# Patient Record
Sex: FEMALE | Race: WHITE | NOT HISPANIC OR LATINO | Employment: OTHER | URBAN - METROPOLITAN AREA
[De-identification: names, ages, dates, MRNs, and addresses within clinical notes are randomized per-mention and may not be internally consistent; named-entity substitution may affect disease eponyms.]

---

## 2018-01-09 NOTE — PROGRESS NOTES
Assessment    1  Ganglion cyst (725 43) (M67 40)    Plan  Ganglion cyst    · You may continue or resume your normal level of activity ; Status:Complete;   Done:  [id number]   · Follow-up PRN Evaluation and Treatment  Follow-up  Status: Complete  Done:  50PGN5793   · Aspiration and or Injection of ganglion cyst, any location - POC; Status:Complete;   Done:  74VYA7424    Discussion/Summary    Patient was seen and examined by Dr Grant Tapia and myself in the office today  Together we formulated a diagnosis and treatment plan which is as follows: The anatomy and physiology of the ganglion was discussed with the patient today in the office  Fluid leaking out of the joint surface typically creates a small sac, which can enlarge and cause pain or limitation of motion  Treatment options include observation, aspiration, or surgical incision were discussed with the patient today  Observation typically lead to resolution and approximately 10% of patients, aspiration least resolution approximately 50% of patients, and surgical excision lead to resolution in approximately 97% of patients  After discussion with the patient today, the patient voiced understanding of all treatment options       At this time patient wishes to proceed with aspiration  She will follow up on as-needed basis  If aspiration is unsuccessful she will proceed with surgical excision  Call for any questions or concerns should arise  Chief Complaint    1  Hand Problem    History of Present Illness  HPI: Patient is a very pleasant 43-year-old female who presents today for evaluation of right index finger DIP ganglion cyst  This is a recurrent cyst the patient had removed a few years ago  While the surgery worked very well at first, patient states this started to reappear approximately one year ago and has become more and more tender since   States she will place a Band-Aid on it at work which compresses it and makes it slightly better, however, area is tender enough patient would like to address this  The past medical history, surgical history, family history, social history, medications, allergies, and review of systems have been read and reviewed on the chart and have been updated  Review of Systems was recorded in the office today on a separate evaluation sheet and is listed below  Review of Systems    Constitutional: No fever, no chills, feels well, no tiredness, no recent weight gain or loss  Eyes: No complaints of eyesight problems, no red eyes  ENT: no loss of hearing, no nosebleeds, no sore throat  Cardiovascular: No complaints of chest pain, no palpitations, no leg claudication or lower extremity edema  Respiratory: no compliants of shortness of breath, no wheezing, no cough  Gastrointestinal: no complaints of abdominal pain, no constipation, no nausea or diarrhea, no vomiting, no bloody stools  Genitourinary: no complaints of dysuria, no incontinence  Musculoskeletal: as noted in HPI  Integumentary: no complaints of skin rash or lesion, no itching or dry skin, no skin wounds  Neurological: no complaints of headache, no confusion, no numbness or tingling, no dizziness  Endocrine: No complaints of muscle weakness, no feelings of weakness, no frequent urination, no excessive thirst    Psychiatric: No suicidal thoughts, no anxiety, no feelings of depression  Active Problems    1  Hyperlipidemia (272 4) (E78 5)   2  Need for pneumococcal vaccination (V03 82) (Z23)   3  Positive hepatitis C antibody test (795 79) (R89 4)    Current Meds   1  No Reported Medications Recorded    Allergies    1  Codeine Derivatives   2  Sudafed TABS    Vitals  Signs [Data Includes: Current Encounter]    Heart Rate: 82  Systolic: 554  Diastolic: 77  Height: 5 ft 4 in  Weight: 187 lb   BMI Calculated: 32 1  BSA Calculated: 1 91    Physical Exam      General: No acute distress, age-appropriate  Neck: Supple, trachea midline     HEENT: Normocephalic atraumatic, mucous membranes are moist, sclera are nonicteric  Cardiovascular: No discernable arrhythmia  Respiratory: Breathing is even and unlabored, no stridor or audible wheezing  Psychiatric: Awake alert and oriented x3, normal mood and affect  Abdomen: Without rebound or guarding  Right Fingers: (A shoe with a ganglion cyst along the posterior DIP the right index finger, just under 1 cm in diameter  Patient describes tenderness to palpation of this area  Patient denies tenderness to palpation of the DIP joint itself  Denies discomfort with motion of the PIP  Sensation is intact  Motor intact  Capillary refill less than 2 seconds  Radial pulse 2+)       Procedure                After confirmation of analgesia and repreparation, the Right index finger ganglion cyst was aspirated with an 18 guague needle  A sterile dressing was then appplied  The patient tolerated the procedure well  Attending Note  Collaborating Physician Note: Collaborating Physician: I interviewed and examined the patient, I supervised the Advanced Practitioner and I agree with the Advanced Practitioner note        Signatures   Electronically signed by : Quinn Dixon [facility]; Jan 27 2016 10:45AM EST                       (Author)    Electronically signed by : POOJA Ochoa ; Jan 27 2016  1:11PM EST                       (Author)

## 2018-01-12 NOTE — MISCELLANEOUS
Message   Recorded as Task   Date: 06/22/2016 08:28 AM, Created By: Daniela Hawk   Task Name: Med Renewal Request   Assigned To: Lee Cassidy   Regarding Patient: Magdy Adhikari, Status: Active   Comment:    Katelin Love - 22 Jun 2016 8:28 AM     TASK CREATED  Caller: Self; Renew Medication; [phone] (Home); [phone] (Work)  Kraig Hays WAS GIVEN AN ANTIBIOTIC BY DR WILEY WHEN SHE GOT HOME FROM [place] WITH AN INFECTION WHICH SHE FINISHED ON THE 17TH AND SHE IS STARTING UP WITH IT AGAIN AND SHE IS WONDERING IF ANOTHER ROUND OF ANTIBIOTICS COULD BE CALLED INTO    [facility]    [phone] (C)   Georgie,Samm - 22 Jun 2016 8:30 AM     TASK EDITED  script sent in for ceftin  please notify   06/22/2016 Patient notified  trb      Active Problems    1  Acute non-recurrent pansinusitis (461 8) (J01 40)   2  Hyperlipidemia (272 4) (E78 5)   3  Positive hepatitis C antibody test (795 79) (R76 8)    Current Meds   1  Cefuroxime Axetil 500 MG Oral Tablet; Take 1 tablet twice daily; Therapy: 29ZJH3090 to (Last [facility] [place])  Requested for: 75RXX9002 Ordered    Allergies    1  Codeine Derivatives   2  Sudafed TABS    Signatures   Electronically signed by :  Philipp Bee, ; Jun 22 2016 10:41AM EST                       (Author)

## 2021-10-08 ENCOUNTER — APPOINTMENT (OUTPATIENT)
Dept: PREADMISSION TESTING | Facility: HOSPITAL | Age: 66
End: 2021-10-08
Payer: COMMERCIAL

## 2021-10-08 VITALS — HEIGHT: 64 IN | BODY MASS INDEX: 35.85 KG/M2 | WEIGHT: 210 LBS

## 2021-10-08 RX ORDER — ACETAMINOPHEN 500 MG
500 TABLET ORAL EVERY 6 HOURS PRN
COMMUNITY

## 2021-10-08 NOTE — PRE-PROCEDURE INSTRUCTIONS
1. We will call you between 3 pm and 7 pm on October 13, 2021 to inform you of arrival time for your procedure. If you do not hear by 6PM, please call [phone] for arrival time.     2. Please arrive through the Main Entrance of the [facility] (across from the parking garage) and report to the Surgery Registration Desk on the day of your procedure.    3. Please follow the following fasting guidelines:     No solid food EIGHT HOURS prior to surgery.  Unlimited clear liquids, meaning water or PLAIN black coffee WITHOUT any milk, cream, sugar, or sweetener are permitted up to TWO HOURS prior to arrival at the hospital.    4. Early on the morning of the procedure please take your usual dose of the listed medications with a sip of water:  No meds  Follow directions regarding holding NSAIDS , ASPIRIN and BLOOD THINNERS from your MD and or Cardiologist    5. Other Instructions: You may brush your teeth the morning of the procedure. Rinse and spit, do not swallow.  Bring a list of your medications with dosages.  Use surgical wash as directed.     6. If you develop a cold, cough, fever, rash, or other symptom prior to the day of the procedure, please report it to your physician immediately.    7. If you need to cancel the procedure for any reason, please contact your physician.    8. Make arrangements to have someone drive you home from the procedure. If you have not arranged for transportation home, your surgery may be cancelled.     9. You may not take public transportation unless accompanied by a responsible person.    10. You may not drive a car or operate complex or potentially dangerous machinery for 24 hours following anesthesia and/or sedation.    11.  If it is medically necessary for you to have a longer stay, you will be informed as soon as the decision is made.    12. Only bring essential items to the hospital.  Do not wear or bring anything of value to the hospital including jewelry of any kind, money, or wallet.  Do not wear make-up or contact lenses.  DO NOT BRING MEDICATIONS FROM HOME. DO bring your hearing aids, glasses, and a case    13. No lotion, creams, powders, or oils on skin the morning of procedure     14. Dress in comfortable clothes.    15.  If instructed, please bring a copy of your Advanced Directive (Living Will/Durable Power of ) on the day of your procedure.     16. Patients need to quarantine from the time of PAT COVID test to day of surgery, regardless of COVID vaccine status.      17. Ensuring your safety at all times is a very important part of our [facility] Culture of Safety. After having surgery and sedation, you are at risk for falling and balance issues. Although you may feel awake, the effects of the medication can last up to 24 hours after anesthesia. If you need to use the bathroom during your recovery period, nursing staff will escort you there and stay with you to ensure your safety.    Pre operative instructions given as per protocol.  Form explained by: Josseline Head RN    I have read and understand the above information. I have had sufficient opportunity to ask questions I might have and they have been answered to my satisfaction. I agree to comply with the Patient Responsibilities listed above and have received a copy of this form.

## 2021-10-11 ENCOUNTER — TRANSCRIBE ORDERS (OUTPATIENT)
Dept: REGISTRATION | Facility: HOSPITAL | Age: 66
End: 2021-10-11
Payer: COMMERCIAL

## 2021-10-11 ENCOUNTER — APPOINTMENT (OUTPATIENT)
Dept: LAB | Facility: HOSPITAL | Age: 66
DRG: 469 | End: 2021-10-11
Attending: ORTHOPAEDIC SURGERY
Payer: COMMERCIAL

## 2021-10-11 ENCOUNTER — HOSPITAL ENCOUNTER (OUTPATIENT)
Dept: CARDIOLOGY | Facility: HOSPITAL | Age: 66
Discharge: HOME | DRG: 469 | End: 2021-10-11
Attending: ORTHOPAEDIC SURGERY
Payer: COMMERCIAL

## 2021-10-11 ENCOUNTER — OFFICE VISIT (OUTPATIENT)
Dept: PREADMISSION TESTING | Facility: HOSPITAL | Age: 66
DRG: 469 | End: 2021-10-11
Attending: ORTHOPAEDIC SURGERY
Payer: COMMERCIAL

## 2021-10-11 VITALS
TEMPERATURE: 98.1 F | WEIGHT: 211 LBS | DIASTOLIC BLOOD PRESSURE: 66 MMHG | HEART RATE: 78 BPM | OXYGEN SATURATION: 98 % | RESPIRATION RATE: 16 BRPM | SYSTOLIC BLOOD PRESSURE: 138 MMHG | HEIGHT: 64 IN | BODY MASS INDEX: 36.02 KG/M2

## 2021-10-11 DIAGNOSIS — M12.571 TRAUMATIC ARTHROPATHY, RIGHT ANKLE AND FOOT: ICD-10-CM

## 2021-10-11 DIAGNOSIS — Z11.59 ENCOUNTER FOR SCREENING FOR OTHER VIRAL DISEASES: Primary | ICD-10-CM

## 2021-10-11 DIAGNOSIS — Z11.59 ENCOUNTER FOR SCREENING FOR OTHER VIRAL DISEASES: ICD-10-CM

## 2021-10-11 DIAGNOSIS — T84.84XA PAINFUL ORTHOPAEDIC HARDWARE (CMS/HCC): Primary | ICD-10-CM

## 2021-10-11 DIAGNOSIS — Z01.818 PRE-OP EVALUATION: ICD-10-CM

## 2021-10-11 LAB
ANION GAP SERPL CALC-SCNC: 9 MEQ/L (ref 3–15)
BUN SERPL-MCNC: 13 MG/DL (ref 8–20)
CALCIUM SERPL-MCNC: 9.2 MG/DL (ref 8.9–10.3)
CHLORIDE SERPL-SCNC: 103 MEQ/L (ref 98–109)
CO2 SERPL-SCNC: 25 MEQ/L (ref 22–32)
CREAT SERPL-MCNC: 0.7 MG/DL (ref 0.6–1.1)
ERYTHROCYTE [DISTWIDTH] IN BLOOD BY AUTOMATED COUNT: 12.3 % (ref 11.7–14.4)
GFR SERPL CREATININE-BSD FRML MDRD: >60 ML/MIN/1.73M*2
GLUCOSE SERPL-MCNC: 94 MG/DL (ref 70–99)
HCT VFR BLDCO AUTO: 43.3 % (ref 35–45)
HGB BLD-MCNC: 13.9 G/DL (ref 11.8–15.7)
MCH RBC QN AUTO: 31.7 PG (ref 28–33.2)
MCHC RBC AUTO-ENTMCNC: 32.1 G/DL (ref 32.2–35.5)
MCV RBC AUTO: 98.6 FL (ref 83–98)
PDW BLD AUTO: 10.4 FL (ref 9.4–12.3)
PLATELET # BLD AUTO: 229 K/UL (ref 150–369)
POTASSIUM SERPL-SCNC: 3.9 MEQ/L (ref 3.6–5.1)
RBC # BLD AUTO: 4.39 M/UL (ref 3.93–5.22)
SARS-COV-2 RNA RESP QL NAA+PROBE: NEGATIVE
SODIUM SERPL-SCNC: 137 MEQ/L (ref 136–144)
WBC # BLD AUTO: 6.91 K/UL (ref 3.8–10.5)

## 2021-10-11 PROCEDURE — 36415 COLL VENOUS BLD VENIPUNCTURE: CPT

## 2021-10-11 PROCEDURE — 80048 BASIC METABOLIC PNL TOTAL CA: CPT

## 2021-10-11 PROCEDURE — U0003 INFECTIOUS AGENT DETECTION BY NUCLEIC ACID (DNA OR RNA); SEVERE ACUTE RESPIRATORY SYNDROME CORONAVIRUS 2 (SARS-COV-2) (CORONAVIRUS DISEASE [COVID-19]), AMPLIFIED PROBE TECHNIQUE, MAKING USE OF HIGH THROUGHPUT TECHNOLOGIES AS DESCRIBED BY CMS-2020-01-R: HCPCS

## 2021-10-11 PROCEDURE — 3008F BODY MASS INDEX DOCD: CPT | Performed by: INTERNAL MEDICINE

## 2021-10-11 PROCEDURE — 99203 OFFICE O/P NEW LOW 30 MIN: CPT | Performed by: INTERNAL MEDICINE

## 2021-10-11 PROCEDURE — 85027 COMPLETE CBC AUTOMATED: CPT

## 2021-10-11 PROCEDURE — 93005 ELECTROCARDIOGRAM TRACING: CPT

## 2021-10-11 ASSESSMENT — PAIN SCALES - GENERAL: PAINLEVEL: 6

## 2021-10-11 NOTE — CONSULTS
[facility] Medicine Service -  Pre-Operative Consultation       Patient Name: Reji Bello  Referring Surgeon: Dr Ren   Reason for Referral: Pre-Operative Evaluation  Surgical Procedure: Right Total Ankle Arthroplasty, Removal Hardware, Tendo-Achilles lengthening  Operative Date: 10/14  Other Providers:      PCP: Ashley Hernández MD         HISTORY OF PRESENT ILLNESS      Reji Bello is a 66 y.o. female presenting today to the [facility] at [facility] for pre-operative evaluation prior to planned surgery.    May 2020 she sustained a mechanical fall when tripping over a cord and battery.  Underwent operative repair in [place] 2 weeks later.  Since she has had increasing pain and swelling.  Has been taking Tylenol and Ibuprofen.  Presents to PAT testing for planned Right Total Ankle Arthroplasty, Removal Hardware, Tendo-Achilles lengthening by Dr Spencer 10/14    In regards to medical history, she describes herself in good health.  Never any cardiac sx, not on any chronic meds    The patient denies any current or recent chest pain or pressure, dyspnea, cough, sputum, fevers, chills, abdominal pain, nausea, vomiting, diarrhea or other symptoms.     Limited due to her ankle, but functionally, the patient is able to ascend a flight or so of stairs with no dyspnea or chest pain.     The patient denies, on specific questioning, the following:  No history of MI, arrhythmia,or CHF.  No history of MARILU.  No history of DVT/PE.  No history of COPD.  No history of CVA.  No history of DM.   No history of CKD.     PAST MEDICAL AND SURGICAL HISTORY      Past Medical History:   Diagnosis Date   • COVID-19 vaccine series completed    • Kidney stones    • Plantar fasciitis     right       Past Surgical History:   Procedure Laterality Date   • BUNIONECTOMY Left    • COLONOSCOPY     • CORRECTION HAMMER TOE Bilateral        MEDICATIONS        Current Outpatient Medications:   •   "acetaminophen (TYLENOL EXTRA STRENGTH) 500 mg tablet, Take 500 mg by mouth every 6 (six) hours as needed for mild pain., Disp: , Rfl:     ALLERGIES      Patient has no known allergies.    FAMILY HISTORY      family history is not on file.    Denies any prior known family history of DVTs/PEs/clotting disorder    SOCIAL HISTORY      Social History     Tobacco Use   • Smoking status: Former Smoker     Quit date:      Years since quittin.7   • Smokeless tobacco: Never Used   Substance Use Topics   • Alcohol use: Yes     Comment: occasional   • Drug use: Never       REVIEW OF SYSTEMS      All other systems reviewed and negative except as noted in HPI    PHYSICAL EXAMINATION      Visit Vitals  /66 (BP Location: Left upper arm, Patient Position: Sitting)   Pulse 78   Temp 36.7 °C (98.1 °F) (Temporal)   Resp 16   Ht 1.626 m (5' 4\")   Wt 95.7 kg (211 lb)   SpO2 98%   BMI 36.22 kg/m²     Body mass index is 36.22 kg/m².    Physical Exam  Vitals and nursing note reviewed.   Constitutional:       Appearance: Normal appearance. She is obese.   HENT:      Head: Normocephalic and atraumatic.   Cardiovascular:      Rate and Rhythm: Normal rate and regular rhythm.      Pulses: Normal pulses.      Heart sounds: Normal heart sounds.   Pulmonary:      Effort: Pulmonary effort is normal.      Breath sounds: Normal breath sounds.   Abdominal:      General: Abdomen is flat. Bowel sounds are normal.      Palpations: Abdomen is soft.   Musculoskeletal:      Left lower leg: Edema present.   Skin:     General: Skin is warm and dry.      Findings: No lesion.   Neurological:      General: No focal deficit present.      Mental Status: She is alert.   Psychiatric:         Mood and Affect: Mood normal.         Behavior: Behavior normal.         Thought Content: Thought content normal.         Judgment: Judgment normal.         LABS / EKG        Labs       Lab Results   Component Value Date     10/11/2021    K 3.9 10/11/2021    "  10/11/2021    BUN 13 10/11/2021    CREATININE 0.7 10/11/2021    WBC 6.91 10/11/2021    HGB 13.9 10/11/2021    HCT 43.3 10/11/2021     10/11/2021         ECG/Telemetry  Ind review: NSR    ASSESSMENT AND PLAN         Painful orthopaedic hardware (CMS/HCC)  -For Right Total Ankle Arthroplasty, Removal Hardware, Tendo-Achilles lengthening 10/14 by Dr Ren  -Postop mgmt/DVT ppx/Pain control per him    Pre-op evaluation  -RCRI 0.4%, no cardiac contraindication to OR  -Covid pending       In regards to perioperative cardiac risk:  The patient denies any history of ischemic heart disease, denies any history of CHF, denies any history of CVA, is not on pre-operative treatment with insulin, and does not have a pre-operative creatinine > 2 mg/dL.   The Revised Cardiac Risk Index (RCRI) for this patient indicates 0.4% risk.     Further comments:  Resume supplements when OK with surgical team.  I would encourage incentive spirometry to assist with minimizing luis-operative pulmonary risk.  DVT prophylaxis and timing of such per the discretion of the surgeon.     Please do not hesitate to contact [facility] during the upcoming hospitalization with any questions or concerns.     Ana Gomez MD  10/11/2021

## 2021-10-11 NOTE — H&P (VIEW-ONLY)
[facility] Medicine Service -  Pre-Operative Consultation       Patient Name: Reji Bello  Referring Surgeon: Dr Ren   Reason for Referral: Pre-Operative Evaluation  Surgical Procedure: Right Total Ankle Arthroplasty, Removal Hardware, Tendo-Achilles lengthening  Operative Date: 10/14  Other Providers:      PCP: Ashley Hernández MD         HISTORY OF PRESENT ILLNESS      Reji Bello is a 66 y.o. female presenting today to the [facility] at [facility] for pre-operative evaluation prior to planned surgery.    May 2020 she sustained a mechanical fall when tripping over a cord and battery.  Underwent operative repair in [place] 2 weeks later.  Since she has had increasing pain and swelling.  Has been taking Tylenol and Ibuprofen.  Presents to PAT testing for planned Right Total Ankle Arthroplasty, Removal Hardware, Tendo-Achilles lengthening by Dr Spencer 10/14    In regards to medical history, she describes herself in good health.  Never any cardiac sx, not on any chronic meds    The patient denies any current or recent chest pain or pressure, dyspnea, cough, sputum, fevers, chills, abdominal pain, nausea, vomiting, diarrhea or other symptoms.     Limited due to her ankle, but functionally, the patient is able to ascend a flight or so of stairs with no dyspnea or chest pain.     The patient denies, on specific questioning, the following:  No history of MI, arrhythmia,or CHF.  No history of MARILU.  No history of DVT/PE.  No history of COPD.  No history of CVA.  No history of DM.   No history of CKD.     PAST MEDICAL AND SURGICAL HISTORY      Past Medical History:   Diagnosis Date    COVID-19 vaccine series completed     Kidney stones     Plantar fasciitis     right       Past Surgical History:   Procedure Laterality Date    BUNIONECTOMY Left     COLONOSCOPY      CORRECTION HAMMER TOE Bilateral        MEDICATIONS        Current Outpatient Medications:      "acetaminophen (TYLENOL EXTRA STRENGTH) 500 mg tablet, Take 500 mg by mouth every 6 (six) hours as needed for mild pain., Disp: , Rfl:     ALLERGIES      Patient has no known allergies.    FAMILY HISTORY      family history is not on file.    Denies any prior known family history of DVTs/PEs/clotting disorder    SOCIAL HISTORY      Social History     Tobacco Use    Smoking status: Former Smoker     Quit date:      Years since quittin.7    Smokeless tobacco: Never Used   Substance Use Topics    Alcohol use: Yes     Comment: occasional    Drug use: Never       REVIEW OF SYSTEMS      All other systems reviewed and negative except as noted in HPI    PHYSICAL EXAMINATION      Visit Vitals  /66 (BP Location: Left upper arm, Patient Position: Sitting)   Pulse 78   Temp 36.7 °C (98.1 °F) (Temporal)   Resp 16   Ht 1.626 m (5' 4\")   Wt 95.7 kg (211 lb)   SpO2 98%   BMI 36.22 kg/m²     Body mass index is 36.22 kg/m².    Physical Exam  Vitals and nursing note reviewed.   Constitutional:       Appearance: Normal appearance. She is obese.   HENT:      Head: Normocephalic and atraumatic.   Cardiovascular:      Rate and Rhythm: Normal rate and regular rhythm.      Pulses: Normal pulses.      Heart sounds: Normal heart sounds.   Pulmonary:      Effort: Pulmonary effort is normal.      Breath sounds: Normal breath sounds.   Abdominal:      General: Abdomen is flat. Bowel sounds are normal.      Palpations: Abdomen is soft.   Musculoskeletal:      Left lower leg: Edema present.   Skin:     General: Skin is warm and dry.      Findings: No lesion.   Neurological:      General: No focal deficit present.      Mental Status: She is alert.   Psychiatric:         Mood and Affect: Mood normal.         Behavior: Behavior normal.         Thought Content: Thought content normal.         Judgment: Judgment normal.         LABS / EKG        Labs       Lab Results   Component Value Date     10/11/2021    K 3.9 10/11/2021    "  10/11/2021    BUN 13 10/11/2021    CREATININE 0.7 10/11/2021    WBC 6.91 10/11/2021    HGB 13.9 10/11/2021    HCT 43.3 10/11/2021     10/11/2021         ECG/Telemetry  Ind review: NSR    ASSESSMENT AND PLAN         Painful orthopaedic hardware (CMS/HCC)  -For Right Total Ankle Arthroplasty, Removal Hardware, Tendo-Achilles lengthening 10/14 by Dr Ren  -Postop mgmt/DVT ppx/Pain control per him    Pre-op evaluation  -RCRI 0.4%, no cardiac contraindication to OR  -Covid pending       In regards to perioperative cardiac risk:  The patient denies any history of ischemic heart disease, denies any history of CHF, denies any history of CVA, is not on pre-operative treatment with insulin, and does not have a pre-operative creatinine > 2 mg/dL.   The Revised Cardiac Risk Index (RCRI) for this patient indicates 0.4% risk.     Further comments:  Resume supplements when OK with surgical team.  I would encourage incentive spirometry to assist with minimizing luis-operative pulmonary risk.  DVT prophylaxis and timing of such per the discretion of the surgeon.     Please do not hesitate to contact [facility] during the upcoming hospitalization with any questions or concerns.     Ana Gomez MD  10/11/2021

## 2021-10-11 NOTE — NURSING NOTE
Dr Gomez paged by Harini GREEN. I obtained weight?Ht and VSS entered into the system. I reviewed the Pre-Procedure instructions, Chlorhexidene already purchased by pt I provided written instructions and reviewed for showers and pt to follow frequency per MD (3 days prior per Nicole). Await MD. Pt voiced understanding of all written instructions/pre op and showering.

## 2021-10-11 NOTE — ASSESSMENT & PLAN NOTE
-For Right Total Ankle Arthroplasty, Removal Hardware, Tendo-Achilles lengthening 10/14 by Dr Ren  -Postop mgmt/DVT ppx/Pain control per him

## 2021-10-12 ENCOUNTER — ANESTHESIA EVENT (OUTPATIENT)
Dept: OPERATING ROOM | Facility: HOSPITAL | Age: 66
Setting detail: SURGERY ADMIT
DRG: 469 | End: 2021-10-12
Payer: COMMERCIAL

## 2021-10-12 LAB
ATRIAL RATE: 84
P AXIS: 65
PR INTERVAL: 108
QRS DURATION: 82
QT INTERVAL: 354
QTC CALCULATION(BAZETT): 418
R AXIS: 70
T WAVE AXIS: 43
VENTRICULAR RATE: 84

## 2021-10-12 PROCEDURE — 93010 ELECTROCARDIOGRAM REPORT: CPT | Performed by: INTERNAL MEDICINE

## 2021-10-14 ENCOUNTER — ANESTHESIA (OUTPATIENT)
Dept: OPERATING ROOM | Facility: HOSPITAL | Age: 66
Setting detail: SURGERY ADMIT
DRG: 469 | End: 2021-10-14
Payer: COMMERCIAL

## 2021-10-14 ENCOUNTER — APPOINTMENT (INPATIENT)
Dept: RADIOLOGY | Facility: HOSPITAL | Age: 66
DRG: 469 | End: 2021-10-14
Attending: ORTHOPAEDIC SURGERY
Payer: COMMERCIAL

## 2021-10-14 ENCOUNTER — HOSPITAL ENCOUNTER (INPATIENT)
Facility: HOSPITAL | Age: 66
LOS: 1 days | Discharge: HOME HEALTH | DRG: 469 | End: 2021-10-15
Attending: ORTHOPAEDIC SURGERY | Admitting: ORTHOPAEDIC SURGERY
Payer: COMMERCIAL

## 2021-10-14 PROBLEM — M19.171 POST-TRAUMATIC ARTHRITIS OF RIGHT ANKLE: Status: ACTIVE | Noted: 2021-10-14

## 2021-10-14 LAB
ABO + RH BLD: NORMAL
BLD GP AB SCN SERPL QL: NEGATIVE
D AG BLD QL: POSITIVE
LABORATORY COMMENT REPORT: NORMAL
SPECIMEN EXP DATE BLD: NORMAL

## 2021-10-14 PROCEDURE — 63600000 HC DRUGS/DETAIL CODE: Performed by: NURSE PRACTITIONER

## 2021-10-14 PROCEDURE — 25800000 HC PHARMACY IV SOLUTIONS: Performed by: ORTHOPAEDIC SURGERY

## 2021-10-14 PROCEDURE — 0QHL05Z INSERTION OF EXTERNAL FIXATION DEVICE INTO RIGHT TARSAL, OPEN APPROACH: ICD-10-PCS | Performed by: ORTHOPAEDIC SURGERY

## 2021-10-14 PROCEDURE — 36000005 HC OR LEVEL 5 INITIAL 30MIN: Performed by: ORTHOPAEDIC SURGERY

## 2021-10-14 PROCEDURE — 0QHG05Z INSERTION OF EXTERNAL FIXATION DEVICE INTO RIGHT TIBIA, OPEN APPROACH: ICD-10-PCS | Performed by: ORTHOPAEDIC SURGERY

## 2021-10-14 PROCEDURE — 71000011 HC PACU PHASE 1 EA ADDL MIN: Performed by: ORTHOPAEDIC SURGERY

## 2021-10-14 PROCEDURE — 0L8N0ZZ DIVISION OF RIGHT LOWER LEG TENDON, OPEN APPROACH: ICD-10-PCS | Performed by: ORTHOPAEDIC SURGERY

## 2021-10-14 PROCEDURE — 25000000 HC PHARMACY GENERAL: Performed by: NURSE PRACTITIONER

## 2021-10-14 PROCEDURE — 63700000 HC SELF-ADMINISTRABLE DRUG: Performed by: NURSE PRACTITIONER

## 2021-10-14 PROCEDURE — 63600000 HC DRUGS/DETAIL CODE: Performed by: NURSE ANESTHETIST, CERTIFIED REGISTERED

## 2021-10-14 PROCEDURE — 63600000 HC DRUGS/DETAIL CODE: Mod: JZ | Performed by: ORTHOPAEDIC SURGERY

## 2021-10-14 PROCEDURE — 12000000 HC ROOM AND CARE MED/SURG

## 2021-10-14 PROCEDURE — C1776 JOINT DEVICE (IMPLANTABLE): HCPCS | Performed by: ORTHOPAEDIC SURGERY

## 2021-10-14 PROCEDURE — 63600000 HC DRUGS/DETAIL CODE: Performed by: ORTHOPAEDIC SURGERY

## 2021-10-14 PROCEDURE — 73600 X-RAY EXAM OF ANKLE: CPT | Mod: RT

## 2021-10-14 PROCEDURE — C1769 GUIDE WIRE: HCPCS | Performed by: ORTHOPAEDIC SURGERY

## 2021-10-14 PROCEDURE — 37000001 HC ANESTHESIA GENERAL: Performed by: ORTHOPAEDIC SURGERY

## 2021-10-14 PROCEDURE — 86901 BLOOD TYPING SEROLOGIC RH(D): CPT

## 2021-10-14 PROCEDURE — 27200000 HC STERILE SUPPLY: Performed by: ORTHOPAEDIC SURGERY

## 2021-10-14 PROCEDURE — 36000015 HC OR LEVEL 5 EA ADDL MIN: Performed by: ORTHOPAEDIC SURGERY

## 2021-10-14 PROCEDURE — 25800000 HC PHARMACY IV SOLUTIONS: Performed by: NURSE PRACTITIONER

## 2021-10-14 PROCEDURE — 63600000 HC DRUGS/DETAIL CODE: Performed by: ANESTHESIOLOGY

## 2021-10-14 PROCEDURE — 71000001 HC PACU PHASE 1 INITIAL 30MIN: Performed by: ORTHOPAEDIC SURGERY

## 2021-10-14 PROCEDURE — 36415 COLL VENOUS BLD VENIPUNCTURE: CPT | Performed by: ORTHOPAEDIC SURGERY

## 2021-10-14 PROCEDURE — C1713 ANCHOR/SCREW BN/BN,TIS/BN: HCPCS | Performed by: ORTHOPAEDIC SURGERY

## 2021-10-14 PROCEDURE — 0QPJ04Z REMOVAL OF INTERNAL FIXATION DEVICE FROM RIGHT FIBULA, OPEN APPROACH: ICD-10-PCS | Performed by: ORTHOPAEDIC SURGERY

## 2021-10-14 PROCEDURE — 0SSF04Z REPOSITION RIGHT ANKLE JOINT WITH INTERNAL FIXATION DEVICE, OPEN APPROACH: ICD-10-PCS | Performed by: ORTHOPAEDIC SURGERY

## 2021-10-14 PROCEDURE — 0SRF0JA REPLACEMENT OF RIGHT ANKLE JOINT WITH SYNTHETIC SUBSTITUTE, UNCEMENTED, OPEN APPROACH: ICD-10-PCS | Performed by: ORTHOPAEDIC SURGERY

## 2021-10-14 PROCEDURE — 25000000 HC PHARMACY GENERAL: Performed by: NURSE ANESTHETIST, CERTIFIED REGISTERED

## 2021-10-14 PROCEDURE — 99233 SBSQ HOSP IP/OBS HIGH 50: CPT | Performed by: HOSPITALIST

## 2021-10-14 PROCEDURE — 0QPG04Z REMOVAL OF INTERNAL FIXATION DEVICE FROM RIGHT TIBIA, OPEN APPROACH: ICD-10-PCS | Performed by: ORTHOPAEDIC SURGERY

## 2021-10-14 DEVICE — IMPLANTABLE DEVICE: Type: IMPLANTABLE DEVICE | Site: ANKLE | Status: FUNCTIONAL

## 2021-10-14 RX ORDER — SODIUM CHLORIDE 9 MG/ML
INJECTION, SOLUTION INTRAVENOUS CONTINUOUS
Status: DISCONTINUED | OUTPATIENT
Start: 2021-10-14 | End: 2021-10-15 | Stop reason: HOSPADM

## 2021-10-14 RX ORDER — ALUMINUM HYDROXIDE, MAGNESIUM HYDROXIDE, AND SIMETHICONE 1200; 120; 1200 MG/30ML; MG/30ML; MG/30ML
30 SUSPENSION ORAL EVERY 4 HOURS PRN
Status: DISCONTINUED | OUTPATIENT
Start: 2021-10-14 | End: 2021-10-15 | Stop reason: HOSPADM

## 2021-10-14 RX ORDER — FENTANYL CITRATE 50 UG/ML
INJECTION, SOLUTION INTRAMUSCULAR; INTRAVENOUS AS NEEDED
Status: DISCONTINUED | OUTPATIENT
Start: 2021-10-14 | End: 2021-10-14 | Stop reason: SURG

## 2021-10-14 RX ORDER — GLYCOPYRROLATE 0.6MG/3ML
SYRINGE (ML) INTRAVENOUS AS NEEDED
Status: DISCONTINUED | OUTPATIENT
Start: 2021-10-14 | End: 2021-10-14 | Stop reason: SURG

## 2021-10-14 RX ORDER — ROCURONIUM BROMIDE 10 MG/ML
INJECTION, SOLUTION INTRAVENOUS AS NEEDED
Status: DISCONTINUED | OUTPATIENT
Start: 2021-10-14 | End: 2021-10-14 | Stop reason: SURG

## 2021-10-14 RX ORDER — AMOXICILLIN 250 MG
1 CAPSULE ORAL 2 TIMES DAILY
Status: DISCONTINUED | OUTPATIENT
Start: 2021-10-14 | End: 2021-10-15 | Stop reason: HOSPADM

## 2021-10-14 RX ORDER — DIPHENHYDRAMINE HCL 25 MG
25 CAPSULE ORAL EVERY 6 HOURS PRN
Status: DISCONTINUED | OUTPATIENT
Start: 2021-10-14 | End: 2021-10-15 | Stop reason: HOSPADM

## 2021-10-14 RX ORDER — HYDROMORPHONE HYDROCHLORIDE 1 MG/ML
.25-.5 INJECTION, SOLUTION INTRAMUSCULAR; INTRAVENOUS; SUBCUTANEOUS
Status: DISCONTINUED | OUTPATIENT
Start: 2021-10-14 | End: 2021-10-15

## 2021-10-14 RX ORDER — DEXTROSE 50 % IN WATER (D50W) INTRAVENOUS SYRINGE
25 AS NEEDED
Status: DISCONTINUED | OUTPATIENT
Start: 2021-10-14 | End: 2021-10-15 | Stop reason: HOSPADM

## 2021-10-14 RX ORDER — HYDROMORPHONE HYDROCHLORIDE 1 MG/ML
INJECTION, SOLUTION INTRAMUSCULAR; INTRAVENOUS; SUBCUTANEOUS AS NEEDED
Status: DISCONTINUED | OUTPATIENT
Start: 2021-10-14 | End: 2021-10-14 | Stop reason: SURG

## 2021-10-14 RX ORDER — ROPIVACAINE HYDROCHLORIDE 5 MG/ML
INJECTION, SOLUTION EPIDURAL; INFILTRATION; PERINEURAL
Status: DISCONTINUED | OUTPATIENT
Start: 2021-10-14 | End: 2021-10-14 | Stop reason: SURG

## 2021-10-14 RX ORDER — FENTANYL CITRATE 50 UG/ML
50 INJECTION, SOLUTION INTRAMUSCULAR; INTRAVENOUS
Status: DISCONTINUED | OUTPATIENT
Start: 2021-10-14 | End: 2021-10-14 | Stop reason: HOSPADM

## 2021-10-14 RX ORDER — OXYCODONE HYDROCHLORIDE 5 MG/1
5 TABLET ORAL EVERY 4 HOURS PRN
Status: DISCONTINUED | OUTPATIENT
Start: 2021-10-14 | End: 2021-10-15 | Stop reason: HOSPADM

## 2021-10-14 RX ORDER — ONDANSETRON HYDROCHLORIDE 2 MG/ML
INJECTION, SOLUTION INTRAVENOUS AS NEEDED
Status: DISCONTINUED | OUTPATIENT
Start: 2021-10-14 | End: 2021-10-14 | Stop reason: SURG

## 2021-10-14 RX ORDER — NEOSTIGMINE METHYLSULFATE 1 MG/ML
INJECTION INTRAVENOUS AS NEEDED
Status: DISCONTINUED | OUTPATIENT
Start: 2021-10-14 | End: 2021-10-14 | Stop reason: SURG

## 2021-10-14 RX ORDER — ASPIRIN 325 MG
325 TABLET ORAL DAILY
Status: DISCONTINUED | OUTPATIENT
Start: 2021-10-15 | End: 2021-10-15 | Stop reason: HOSPADM

## 2021-10-14 RX ORDER — ACETAMINOPHEN 325 MG/1
650 TABLET ORAL EVERY 4 HOURS PRN
Status: DISCONTINUED | OUTPATIENT
Start: 2021-10-14 | End: 2021-10-14

## 2021-10-14 RX ORDER — LABETALOL HCL 20 MG/4 ML
SYRINGE (ML) INTRAVENOUS AS NEEDED
Status: DISCONTINUED | OUTPATIENT
Start: 2021-10-14 | End: 2021-10-14 | Stop reason: SURG

## 2021-10-14 RX ORDER — ROPIVACAINE HYDROCHLORIDE 5 MG/ML
INJECTION, SOLUTION EPIDURAL; INFILTRATION; PERINEURAL
Status: COMPLETED | OUTPATIENT
Start: 2021-10-14 | End: 2021-10-14

## 2021-10-14 RX ORDER — ASPIRIN 325 MG
325 TABLET ORAL DAILY
Qty: 30 TABLET | Refills: 0 | COMMUNITY
Start: 2021-10-15 | End: 2021-11-14

## 2021-10-14 RX ORDER — DEXTROSE 40 %
15-30 GEL (GRAM) ORAL AS NEEDED
Status: DISCONTINUED | OUTPATIENT
Start: 2021-10-14 | End: 2021-10-15 | Stop reason: HOSPADM

## 2021-10-14 RX ORDER — ONDANSETRON HYDROCHLORIDE 2 MG/ML
4 INJECTION, SOLUTION INTRAVENOUS
Status: DISCONTINUED | OUTPATIENT
Start: 2021-10-14 | End: 2021-10-14 | Stop reason: HOSPADM

## 2021-10-14 RX ORDER — IBUPROFEN 200 MG
16-32 TABLET ORAL AS NEEDED
Status: DISCONTINUED | OUTPATIENT
Start: 2021-10-14 | End: 2021-10-15 | Stop reason: HOSPADM

## 2021-10-14 RX ORDER — HYDROMORPHONE HYDROCHLORIDE 1 MG/ML
0.5 INJECTION, SOLUTION INTRAMUSCULAR; INTRAVENOUS; SUBCUTANEOUS
Status: DISCONTINUED | OUTPATIENT
Start: 2021-10-14 | End: 2021-10-14 | Stop reason: HOSPADM

## 2021-10-14 RX ORDER — VANCOMYCIN/0.9 % SOD CHLORIDE 1.5G/250ML
15 PLASTIC BAG, INJECTION (ML) INTRAVENOUS ONCE
Status: COMPLETED | OUTPATIENT
Start: 2021-10-14 | End: 2021-10-14

## 2021-10-14 RX ORDER — CEFAZOLIN SODIUM/WATER 2 G/20 ML
2 SYRINGE (ML) INTRAVENOUS ONCE
Status: COMPLETED | OUTPATIENT
Start: 2021-10-14 | End: 2021-10-14

## 2021-10-14 RX ORDER — MIDAZOLAM HYDROCHLORIDE 2 MG/2ML
INJECTION, SOLUTION INTRAMUSCULAR; INTRAVENOUS AS NEEDED
Status: DISCONTINUED | OUTPATIENT
Start: 2021-10-14 | End: 2021-10-14 | Stop reason: SURG

## 2021-10-14 RX ORDER — ONDANSETRON HYDROCHLORIDE 2 MG/ML
4 INJECTION, SOLUTION INTRAVENOUS EVERY 8 HOURS PRN
Status: DISCONTINUED | OUTPATIENT
Start: 2021-10-14 | End: 2021-10-15 | Stop reason: HOSPADM

## 2021-10-14 RX ORDER — LIDOCAINE HYDROCHLORIDE 10 MG/ML
INJECTION, SOLUTION INFILTRATION; PERINEURAL AS NEEDED
Status: DISCONTINUED | OUTPATIENT
Start: 2021-10-14 | End: 2021-10-14 | Stop reason: SURG

## 2021-10-14 RX ORDER — PROPOFOL 10 MG/ML
INJECTION, EMULSION INTRAVENOUS AS NEEDED
Status: DISCONTINUED | OUTPATIENT
Start: 2021-10-14 | End: 2021-10-14 | Stop reason: SURG

## 2021-10-14 RX ORDER — VANCOMYCIN HYDROCHLORIDE 500 MG/10ML
INJECTION, POWDER, LYOPHILIZED, FOR SOLUTION INTRAVENOUS AS NEEDED
Status: DISCONTINUED | OUTPATIENT
Start: 2021-10-14 | End: 2021-10-14 | Stop reason: HOSPADM

## 2021-10-14 RX ORDER — AMOXICILLIN 250 MG
1 CAPSULE ORAL 2 TIMES DAILY
Qty: 60 TABLET | Refills: 0 | COMMUNITY
Start: 2021-10-14 | End: 2021-11-13

## 2021-10-14 RX ORDER — DEXAMETHASONE SODIUM PHOSPHATE 4 MG/ML
INJECTION, SOLUTION INTRA-ARTICULAR; INTRALESIONAL; INTRAMUSCULAR; INTRAVENOUS; SOFT TISSUE AS NEEDED
Status: DISCONTINUED | OUTPATIENT
Start: 2021-10-14 | End: 2021-10-14 | Stop reason: SURG

## 2021-10-14 RX ORDER — ACETAMINOPHEN 325 MG/1
650 TABLET ORAL EVERY 6 HOURS
Status: DISCONTINUED | OUTPATIENT
Start: 2021-10-14 | End: 2021-10-15 | Stop reason: HOSPADM

## 2021-10-14 RX ADMIN — FENTANYL CITRATE 50 MCG: 50 INJECTION INTRAMUSCULAR; INTRAVENOUS at 12:32

## 2021-10-14 RX ADMIN — HYDROMORPHONE HYDROCHLORIDE 0.5 MG: 1 INJECTION, SOLUTION INTRAMUSCULAR; INTRAVENOUS; SUBCUTANEOUS at 12:12

## 2021-10-14 RX ADMIN — SODIUM CHLORIDE: 9 INJECTION, SOLUTION INTRAVENOUS at 07:26

## 2021-10-14 RX ADMIN — LABETALOL HYDROCHLORIDE 5 MG: 5 INJECTION, SOLUTION INTRAVENOUS at 10:48

## 2021-10-14 RX ADMIN — GLYCOPYRROLATE 0.6 MG: 0.2 INJECTION, SOLUTION INTRAMUSCULAR; INTRAVITREAL at 12:04

## 2021-10-14 RX ADMIN — FENTANYL CITRATE 50 MCG: 50 INJECTION INTRAMUSCULAR; INTRAVENOUS at 12:41

## 2021-10-14 RX ADMIN — DEXAMETHASONE SODIUM PHOSPHATE 4 MG: 4 INJECTION, SOLUTION INTRA-ARTICULAR; INTRALESIONAL; INTRAMUSCULAR; INTRAVENOUS; SOFT TISSUE at 08:17

## 2021-10-14 RX ADMIN — ONDANSETRON 4 MG: 2 INJECTION INTRAMUSCULAR; INTRAVENOUS at 12:05

## 2021-10-14 RX ADMIN — VANCOMYCIN HYDROCHLORIDE 1500 MG: 1 INJECTION, POWDER, LYOPHILIZED, FOR SOLUTION INTRAVENOUS at 07:25

## 2021-10-14 RX ADMIN — HYDROMORPHONE HYDROCHLORIDE 0.5 MG: 1 INJECTION, SOLUTION INTRAMUSCULAR; INTRAVENOUS; SUBCUTANEOUS at 10:50

## 2021-10-14 RX ADMIN — LABETALOL HYDROCHLORIDE 5 MG: 5 INJECTION, SOLUTION INTRAVENOUS at 08:20

## 2021-10-14 RX ADMIN — PROPOFOL 200 MG: 10 INJECTION, EMULSION INTRAVENOUS at 07:43

## 2021-10-14 RX ADMIN — FENTANYL CITRATE 100 MCG: 50 INJECTION INTRAMUSCULAR; INTRAVENOUS at 07:43

## 2021-10-14 RX ADMIN — Medication 2 G: at 08:00

## 2021-10-14 RX ADMIN — LABETALOL HYDROCHLORIDE 5 MG: 5 INJECTION, SOLUTION INTRAVENOUS at 08:17

## 2021-10-14 RX ADMIN — CEFAZOLIN SODIUM 2 G: 10 POWDER, FOR SOLUTION INTRAVENOUS at 20:51

## 2021-10-14 RX ADMIN — FENTANYL CITRATE 50 MCG: 50 INJECTION INTRAMUSCULAR; INTRAVENOUS at 08:34

## 2021-10-14 RX ADMIN — ACETAMINOPHEN 650 MG: 325 TABLET, FILM COATED ORAL at 18:54

## 2021-10-14 RX ADMIN — ROPIVACAINE HYDROCHLORIDE 15 ML: 5 INJECTION, SOLUTION EPIDURAL; INFILTRATION; PERINEURAL at 12:12

## 2021-10-14 RX ADMIN — ROPIVACAINE HYDROCHLORIDE 20 ML: 5 INJECTION, SOLUTION EPIDURAL; INFILTRATION; PERINEURAL at 12:22

## 2021-10-14 RX ADMIN — Medication 2 G: at 11:52

## 2021-10-14 RX ADMIN — OXYCODONE HYDROCHLORIDE 5 MG: 5 TABLET ORAL at 18:54

## 2021-10-14 RX ADMIN — MIDAZOLAM HYDROCHLORIDE 2 MG: 1 INJECTION, SOLUTION INTRAMUSCULAR; INTRAVENOUS at 07:39

## 2021-10-14 RX ADMIN — FENTANYL CITRATE 50 MCG: 50 INJECTION INTRAMUSCULAR; INTRAVENOUS at 08:17

## 2021-10-14 RX ADMIN — DOCUSATE SODIUM AND SENNOSIDES 1 TABLET: 8.6; 5 TABLET, FILM COATED ORAL at 20:12

## 2021-10-14 RX ADMIN — ROCURONIUM BROMIDE 50 MG: 10 INJECTION INTRAVENOUS at 07:43

## 2021-10-14 RX ADMIN — HYDROMORPHONE HYDROCHLORIDE 1 MG: 1 INJECTION, SOLUTION INTRAMUSCULAR; INTRAVENOUS; SUBCUTANEOUS at 08:12

## 2021-10-14 RX ADMIN — Medication 4 MG: at 12:04

## 2021-10-14 RX ADMIN — LIDOCAINE HYDROCHLORIDE 5 ML: 10 INJECTION, SOLUTION INFILTRATION; PERINEURAL at 07:43

## 2021-10-14 ASSESSMENT — PATIENT HEALTH QUESTIONNAIRE - PHQ9: SUM OF ALL RESPONSES TO PHQ9 QUESTIONS 1 & 2: 0

## 2021-10-14 NOTE — CONSULTS
Hospital Medicine Service -  Daily Progress Note       SUBJECTIVE   Interval History: patient received R total ankle arthroplasty today w/ Dr. Koo. Tolerated procedure well. No chest pain or sob. No n/v. No abd pain.      OBJECTIVE      Vital signs in last 24 hours:  Temp:  [36.3 °C (97.4 °F)-37.6 °C (99.7 °F)] 36.6 °C (97.9 °F)  Heart Rate:  [60-82] 60  Resp:  [16-24] 16  BP: (109-179)/(55-78) 117/56    Intake/Output Summary (Last 24 hours) at 10/14/2021 1702  Last data filed at 10/14/2021 1315  Gross per 24 hour   Intake 900 ml   Output 450 ml   Net 450 ml       PHYSICAL EXAMINATION      Physical Exam     GEN: well-developed and well-nourished; not in acute distress  HEENT: normocephalic; atraumatic  NECK: no JVD; no bruits  CARDIO: regular rate and rhythm; no murmurs or rubs  RESP: clear to auscultation bilaterally; no rales, rhonchi, or wheezes  ABD: soft, non-distended, non-tender, normal bowel sounds  EXT: LLE dressing c/d/i  SKIN: clean, dry, warm, and intact  MUSCULOSKELETAL: no injury or deformity  NEURO: alert and oriented x 3; nonfocal  BEHAVIOR/EMOTIONAL: appropriate; cooperative     LINES, CATHETERS, DRAINS, AIRWAYS, AND WOUNDS   Lines, Drains, Airways, Wounds:  Peripheral IV (Adult) 10/14/21 Left; Posterior Forearm (Active)   Number of days: 0       Surgical Incision Ankle Right (Active)   Number of days: 0       Comments:      LABS / IMAGING / TELE      Labs  Lab Results   Component Value Date    WBC 6.91 10/11/2021    HGB 13.9 10/11/2021    HCT 43.3 10/11/2021    MCV 98.6 (H) 10/11/2021     10/11/2021     Lab Results   Component Value Date    GLUCOSE 94 10/11/2021    CALCIUM 9.2 10/11/2021     10/11/2021    K 3.9 10/11/2021    CO2 25 10/11/2021     10/11/2021    BUN 13 10/11/2021    CREATININE 0.7 10/11/2021     No results found for: INR, PROTIME    Micro  Microbiology Results     Procedure Component Value Units Date/Time    COVID-19 PAT/Pre-procedural [[id number]]  (Normal)  Collected: 10/11/21 1020    Specimen: Nasopharyngeal Swab from Nasopharynx Updated: 10/11/21 1844    Narrative:      The following orders were created for panel order COVID-19 PAT/Pre-procedural.  Procedure                               Abnormality         Status                     ---------                               -----------         ------                     SARS-CoV-2 (COVID-19), PCR[[id number]]   Normal              Final result                 Please view results for these tests on the individual orders.    SARS-CoV-2 (COVID-19), PCR [[id number]]  (Normal) Collected: 10/11/21 1020    Specimen: Nasopharyngeal Swab from Nasopharynx Updated: 10/11/21 1844     SARS-CoV-2 (COVID-19) Negative          Imaging  X-RAY ANKLE RIGHT 2 VIEWS    Result Date: 10/14/2021  IMPRESSION: Intraoperative fluoroscopic guidance as described above.     FL FLUOROSCOPY TECHNICAL ASSISTANCE    Result Date: 10/14/2021  IMPRESSION: Intraoperative fluoroscopic guidance as described above.       ECG/Telemetry  Patient is not on telemetry.    ASSESSMENT AND PLAN      * Post-traumatic arthritis of right ankle  Assessment & Plan  S/p R total ankle arthroplasty 10/14/21  POD #0  Management per primary team      Plan of care discussed with patient, nurse     VTE Assessment: Padua    VTE Prophylaxis Plan: asa  Code Status: Full Code  Estimated Discharge Date: 10/15/2021     Farooq Fall MD  10/14/2021

## 2021-10-14 NOTE — ANESTHESIA POSTPROCEDURE EVALUATION
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)      (Painful Hardware T84.84xa)      (Achilles Contracture M67.01)    Surgeons: Ramon Ren MD Responsible Provider: Bola Nagy MD    Anesthesia Type: general, regional ASA Status: 2          Anesthesia Type: general, regional  PACU Vitals  10/14/2021 1229 - 10/14/2021 1329      10/14/2021  1242 10/14/2021  1245 10/14/2021  1300 10/14/2021  1315    BP: 130/58 119/66 123/57 125/78    Temp: 37.2 °C (99 °F) -- -- --    Pulse: 71 82 68 66    Resp: 18 18 18 24    SpO2: 99 % 100 % 98 % 98 %            Anesthesia Post Evaluation    Pain management: adequate  Patient participation: complete - patient participated  Level of consciousness: awake and alert  Cardiovascular status: acceptable  Airway Patency: adequate  Respiratory status: acceptable  Hydration status: acceptable  Anesthetic complications: no

## 2021-10-14 NOTE — BRIEF OP NOTE
Right Total Ankle Arthroplasty, Removal Hardware, Tendo-Achilles lengthening (R) Procedure Note    Procedure:    Right Total Ankle Arthroplasty, Removal Hardware, Tendo-Achilles lengthening  CPT(R) Code:  63896 - FL TOTAL ANKLE REPLACEMENT      Pre-op Diagnosis     * Traumatic arthritis of right foot [M12.571]     * Painful orthopaedic hardware (CMS/HCC) [T84.84XA]     * Contracture of right Achilles tendon [M67.01]       Post-op Diagnosis     * Traumatic arthritis of right foot [M12.571]     * Painful orthopaedic hardware (CMS/HCC) [T84.84XA]     * Contracture of right Achilles tendon [M67.01]    Surgeon(s) and Role:     * Ramon Ren MD - Primary    Anesthesia: General    Staff:   Circulator: Lang Ybarra RN; Val Martinez RN; Yoselyn Ac RN; Adi Olivo RN  Scrub Person: Angely Manzano RN; Greg Marin    Procedure Details   See full dictated op note    Estimated Blood Loss: 250 mL    Specimens:                Order Name Source Comment Collection Info Order Time   TYPE AND SCREEN Blood, Venous  Collected By: Shalini Joseph RN 10/14/2021  6:39 AM     Are you aware of this patient having previously identified antibodies?   NO          Does this Patient have Sickle Cell Disease/Sickle Cell Anemia?   NO          Release to patient   Immediate              Drains: * No LDAs found *    Implants:   Implant Name Type Inv. Item Serial No.  Lot No. LRB No. Used Action   vishnu talar component right    [employer] [id number] Right 1 Implanted   PROLONG TIBIAL INSERT  3 - [id number]  PROLONG TIBIAL INSERT SZ 3  [employer] [id number] Right 1 Implanted   vishnu tibial base component    [employer] [id number] Right 1 Implanted   zip tight fixation    [employer] [id number] Right 1 Implanted   6 hole distal fibula plate Bone plate   [employer]  Right 1 Implanted   2.7x24mm cortical screw    [employer]  Right 1 Implanted   3.5x14mm  cortical screw Bone screw   [employer]  Right 3 Implanted   2.7x16mm Bone screw   [employer]  Right 1 Implanted   2.7x18mm locking screw    [employer]  Right 1 Implanted   2.7x14mm locking screw    [employer]  Right 2 Implanted              Complications:  None; patient tolerated the procedure well.           Disposition: PACU - hemodynamically stable.           Condition: stable    Ramon Ren MD  Phone Number: [phone]

## 2021-10-14 NOTE — ANESTHESIA PROCEDURE NOTES
Peripheral Block    Patient location during procedure: OR  Start time: 10/14/2021 12:08 PM  End time: 10/14/2021 12:12 PM  Reason for block: at surgeon's request and post-op pain management  Staffing  Performed: anesthesiologist   Anesthesiologist: Bola Nagy MD  Preanesthetic Checklist  Completed: patient identified, site marked, surgical consent, pre-op evaluation, timeout performed, IV checked, risks and benefits discussed, monitors and equipment checked and sterile field maintained during procedure  Peripheral Block  Patient position: supine  Prep: ChloraPrep  Patient monitoring: heart rate, cardiac monitor, continuous pulse ox and blood pressure  Block type: saphenous  Laterality: right  Injection technique: single-shot      Guidance: ultrasound guided  Image captured and stored.        Needle  Needle gauge: 21 G  Needle length: 3 1/8 in  Needle localization: ultrasound guidance  Needle insertion depth: 4 cm  Test dose: negative  Assessment  Injection assessment: negative aspiration for heme, no paresthesia on injection, incremental injection and local visualized surrounding nerve on ultrasound  Paresthesia pain: none  Heart rate change: no  Slow fractionated injection: yes  Medications Administered - 10/14/2021 12:12 PM   Ropivacaine PF (NAROPIN) injection 0.5 %, 15 mL

## 2021-10-14 NOTE — DISCHARGE INSTRUCTIONS
[phone]    Ramon Delgado MD  Orthopedic Surgery   Foot and Ankle       Patient Discharge Information Sheet:         Total Ankle Replacement      General Instructions: This information is designed to answer the typical questions that arise after surgery. Since many people do not know what to expect, the process can be frightening. Hopefully, by reading the following information, many of your questions and concerns will be addressed.    Day of Discharge:    Diet: Regular diet as tolerated. If you have any problems with nausea from the anesthesia, you should begin with a liquid diet and then advance it as tolerated.  Please make an effort to eat healthy during the postoperative period as proper nutrition is particularly important in wound healing.      Medications: Most patients are given either an ankle nerve block or popliteal nerve block for this surgery and typically, this block will last anywhere between 8-36 hours, depending on which type of block you receive.    Pain is to be expected and typically can be controlled with the pain medication that you were prescribed.  Once you feel like your block is wearing off you should begin taking your pain medication immediately - its much harder to get comfortable if you wait until the pain is unbearable to take your medication (the medication typically takes 30-45 minutes to work). When you first start taking your pain medication, you should try to have something to eat beforehand.  This medication can cause nausea, and it is more frequent, when given on an empty stomach.  The medication you have been given for pain is a narcotic medicine that may also contain Tylenol.    You MAY NOT use an anti-inflammatory medicine like Motrin or Aleve (provided that you do not have an allergy to these medications, have a history of stomach ulcers or bleeding, or are currently taking a blood thinner medication). The combination of an anti-inflammatory medication with  the narcotic medicine is very effective at controlling pain. Do NOT take additional Tylenol (acetaminophen) with your prescribed narcotic medicine if it also contains Tylenol (if you were prescribed Percocet or Vicodin, i.e.).  If you had a surgery that relies on bony healing, you should not take anti-inflammatory medicine, as it can delay bone healing.      Take ___________ASA 325mg___________________ daily for ___6___ weeks after surgery to help prevent blood clots.    You have also been given a prescription for Vistaril (Hydroxyzine), which is an anti-nausea medication.  This medication is effective in controlling nausea related to your surgery and the pain medication.  Additionally, it can work with your pain medication to give you additional relief as well as help with any itching that you may get.  Take as directed.    You should also take an over the counter stool softener, such as Colace (Docusate), as long as you are taking your pain medication.  Colace will NOT give you diarrhea - rather it will help to prevent you from becoming constipated, which is a side effect of your pain medication. Take as directed.  General anesthesia can often slow down the natural movement within your gastrointestinal tract.  You should generally have a bowel movement within two days of surgery.  If you have not, over the counter laxatives are permitted to assist you with this (such as Miralax).     Ice: Ice is very effective at controlling swelling and pain in the first week after surgery. However, you will have such a large dressing that ice is unlikely to provide significant additional pain relief following your surgery but you are welcome to keep the dressing cool which can help.      Elevation: Keep your foot and ankle above the level of your heart as often as possible. This reduces the swelling and will GREATLY REDUCE the pain.  I generally like to think that if your are lying down watching TV and your foot/ankle is  directly in front of you, you are elevating it enough if you have to move your leg to the side in order to see the television - otherwise your leg is not elevated enough (generally 3-4 pillows).  Another way to think about it is to make sure your toes are above the level of your nose (Toes above the nose!).      Showering: If you shower, you must keep your dressing clean and dry. If it gets wet, you could increase your risk of acquiring an infection.  Generally if you would like to shower, you should do one of the followin. Put your foot in a cast cover; available at most drug stores or on the internet at http://www.[employer]/legs_castcover.html  2. Wrap your foot in a plastic bag, sealed around your leg with tape - then a second plastic bag (on top of the first) sealed further up your leg  3. Seat yourself in an empty bathtub and place your foot out of the tub on the side.  Fill the tub with water and bathe - then drain the tub and get out once the tub is empty.    Activity: You must NOT place any weight on your operated extremity. Use your crutches to assist you in keeping your operated extremity NON-WEIGHTBEARING.  A Roll-about walker can also be rented (brochures are available in the office or information at www.rollabout.GraphOn).  Many patients find these devices particularly helpful following this surgery and I would advise you to consider obtaining one.      Dressing: You should not remove the dressings and splint that were placed on your leg at the end of your surgical procedure. Keep the dressing and wound clean and dry at all times. The dressings will be changed at your initial follow-up appointment in 10-14 days.    DVT Prophylaxis: You will be prescribed or instructed to take aspirin or another blood thinner after surgery to reduce your risk of developing a DVT (blood clot in your leg) or PE (blood clot in your lungs).  Please refer to the medication section of these instructions.  Take as  instructed.  Additionally, perform gentle leg exercises: flex and extend your hip and knees; wiggle your toes (if able to) unless otherwise instructed.      Follow-up Appointment: You will need to schedule an appointment for 10-14 days after your surgery. You can do this by calling [phone] and scheduling an appointment.    Driving: If your LEFT foot/ankle has been operated on, you are welcome to drive an automatic automobile as soon as you feel comfortable and are no longer taking narcotic pain medication.  If your RIGHT foot/ankle has been operated on, you will not be able to drive for 6 or more weeks from your surgery.  Studies have shown that until that time, you are not able to place sufficient pressure on the brakes to avoid a head-on collision on a local road.      Swimming: You may not get your dressing or cast wet. [employer] (http://www.xerosox.com/legs_castcover.html) makes a product which may allow you to put your leg in a pool with a cast on, but otherwise, this is not permitted until I tell you it is okay. You will be told when you can start submerging your incision, which will depend on the healing.    Questions/Concerns: If you have any problems following your surgery (uncontrollable pain despite taking the narcotic medication as prescribed and a supplemental anti-inflammatory medication, saturation of your dressings, fever of greater than 101.5 degrees Fahrenheit), you may contact my office Monday-Friday between the hours of 8 AM and 4:30 PM. If you have an emergency after hours or over the weekend, you should call that same number and you will be put in touch with a clinical member of the Orthopedic team to help you.  You should also go to the nearest emergency room if you develop severe calf pain, shortness of breath, or chest pain/pressure.        First Post-Operative Visit (10-14 days after surgery):    **Please bring unused pain medication to your first post-op visit    At this visit, you  will have your wound checked.    Your sutures may be removed at this time depending on the appearance of the wound.  Your leg will be placed into a CAM boot.  You may come out of the boot to move your ankle up and down.  We may have you start deep knee bends.  We will instruct you on how to do these exercises at this time.   We will also discuss weight-bearing status at this time. If your foot/leg swells, elevate the leg above the level of your heart; this should help reduce the swelling. If you have any discoloration of your toes (which is not uncommon), numbness in your toes, or excessive pain, you should first try elevating your leg. If this does not cause your symptoms to resolve, you should call as above or go to the emergency room for assistance.  You should also go to the nearest emergency room if you develop severe calf pain, shortness of breath, or chest pain/pressure.        Second Post-Operative Visit (~6 weeks after your surgery):    At this visit, you will be sent for x-rays of your ankle/foot.  Depending upon the results of your x-rays, you will be allowed to start putting weight on your leg in the boot if your x-rays reveal adequate healing. Use your crutches to assist you until you can place full weight on your leg and walk without any discomfort. You may then discontinue use of the crutches - this is what we mean by weight bearing as tolerated. You may also be allowed to begin physical therapy for motion of your ankle. You may be given a prescription for this.  You may wean out of the boot (stop using it) as you and your physical therapist think is appropriate.  Generally, this is over a 4-6 week period and is when you feel comfortable walking without it.      Third Post-Operative Visit (12 weeks after your surgery):    At this visit, you will be sent for x-rays, which should reveal complete healing. It is expected at this point that you will no longer require any immobilization and that you  will be allowed to bear full weight on your leg.  At this point you should be able to begin resuming leisure activities and may still be continuing physical therapy.  Further follow-up with be discussed at this time.

## 2021-10-14 NOTE — OP NOTE
Indication for surgery: The patient is a 66-year-old female who had previously undergone open reduction internal fixation of her right ankle in [place].  She unfortunately has gone on to develop significant posttraumatic arthritis of the right ankle which has been debilitating for her.  A discussion was held with the patient regarding both continued nonoperative as well as operative treatment options for her symptoms.  Due to her previous failure of nonoperative management, she wishes to proceed with surgical intervention.  The risks, benefits, alternative treatment options, as well as expected postoperative recovery were discussed with her in detail.  Risks of surgery include, but are not limited to, bleeding, infection, neurovascular injury, damage to adjacent structures, implant failure, hardware irritation, wound healing complications, need for further procedures, DVT/PE, RSD/CRPS, as well as the risks associated with anesthesia.  Additionally, the patient was warned that she has an increased risk for mike the COVID-19 virus as result of undergoing general anesthesia in a healthcare facility.  She had ample opportunity have all questions answered.  She expressed understanding of her risks and wishes to proceed with surgical intervention.    Preoperative diagnosis:  1.  Posttraumatic arthritis of right ankle  2.  Painful deep orthopedic hardware, right ankle  3.  Right Achilles tendon contracture    Postoperative diagnosis:  1.  Posttraumatic arthritis of right ankle  2.  Painful deep orthopedic hardware, right ankle  3.  Right Achilles tendon contracture  4.  Right ankle syndesmotic instability    Operation:  1.  Right total ankle arthroplasty  2.  Application of multiplanar external fixator to right lower leg  3.  Open reduction internal fixation of right ankle syndesmosis  4.  Removal of deep orthopedic hardware, right ankle  5.  Right tendo Achilles lengthening  6.  AP, mortise, and lateral radiographs  of right ankle using C arm with interpretation    Date of procedure: October 14, 2021    Surgeon: Ramon Ren MD    Assistant: Kamla Rodriguez DO    Anesthesia: General endotracheal plus postoperative saphenous and popliteal nerve blocks    Estimated blood loss: 250 cc    Findings: End-stage posttraumatic arthritis of the right ankle with painful deep orthopedic hardware.   Following implantation of the final components and restoration of alignment to the fibula, she was noted to have syndesmotic instability necessitating syndesmotic reduction and fixation.  She also had an Achilles contracture.    Specimens: None    Complications: None    Procedure:  The patient was seen and identified in the preoperative holding area.  The operative extremity was marked after confirmation with the patient.  Informed consent which had been signed previously was reviewed.  The patient was brought to the operating room where she was placed supine on the operating room table and all bony prominences and extremities were well-padded.  Intravenous antibiotics were started and anesthesia was induced by the anesthesia team.  A sequential compression device was placed on the nonoperative lower leg.  A tourniquet was applied to the right thigh.  The right lower extremity was then prepped and draped in standard sterile fashion.    A surgical timeout was performed to confirm the patient's identity, laterality of the affected extremity, the intended procedure, the preoperative administration of antibiotics, and all other factors associated with a standard timeout protocol.  Once this was confirmed, the right lower extremity was exsanguinated using an Esmarch and the thigh tourniquet was inflated to 250 mmHg.  An incision was then made laterally overlying the distal fibula.  This was performed utilizing the patient's previous surgical incision.  A combination of blunt and sharp dissection was utilized to get through the skin and subcutaneous  tissues.  The periosteum overlying her distal fibular plate was sharply incised and elevated over the distal 4 to 5 cm of the distal fibula.  A periosteal elevator was then used to elevate the periosteum off the fibular plate more proximally as well as to bluntly split the overlying periosteum so as to avoid iatrogenic injury to any crossing branches of the superficial peroneal nerve.  The entirety of the previously placed fibular plate and screw construct was exposed.  This was subsequently removed without difficulty.  I then made an incision anteriorly overlying the right ankle.  A combination of blunt and sharp dissection was utilized to get through the skin and subcutaneous tissues down to the anterior aspect of the distal tibia.  She had a 4.0 mm cannulated screw that was placed from anterior to posteriorly from her prior surgery.  This was localized, and able to be removed without difficulty.    A 0.062 inch Gabby wire was inserted approximately 1-1/2 cm proximal to the ankle joint line in the anterior aspect of the distal fibula to vidal the distalmost extent of my planned fibular osteotomy.  This was checked using multiplanar fluoroscopy.  A long oblique osteotomy of the distal fibula was then made from posterior to anterior.  The fibula was then freed of all surrounding soft tissue attachments and reflected plantarly and pinned percutaneously to the calcaneus so as to ensure that it would not interfere with the surgical procedure.  A blunt Hartmann was then inserted into the tibiotalar joint and utilized to free the ankle capsule anteriorly and posteriorly.  The depth gauge for the Hollis total ankle system was then inserted into the tibiotalar joint to size the tibial and talar components.  Appropriate placement of the depth gauge was confirmed using fluoroscopy.  It was determined that the patient would be a size 3 for both the tibial and talar components.    I then proceeded to apply a multiplanar  external fixator to the right lower extremity to ensure stability and deformity correction.  The right foot and lower extremity was placed in the Hollis total ankle frame which had been previously assembled.  Level positioning was confirmed.  A transcalcaneal pin was then inserted from medial to lateral and affixed to the frame.  I then utilized this to correct the patient's hindfoot alignment.  I then inserted a half pin into the talar neck under direct fluoroscopic guidance.  This was then secured to the frame.  I then corrected the hindfoot alignment and locked this to the frame.  I then placed a half pin in the tibia approximately 5 cm proximal to the joint line from anterior to posterior.  This was then utilized to correct the anterior subluxation of the ankle.  This happened was then locked to the frame.  A second tibial half pin was placed in the tibia more proximally from medial to lateral to add further stability to the construct.  I then used an additional short carbon bar to connect the talar pin to the distal tibial pin, again to add further stability to the construct.  The frame was completely locked into place after both talar and tibial alignment was confirmed using multiplanar fluoroscopy.    The cutting guide was then attached to the frame and the expected amount of bony resection was inspected.  The cutting guide was adjusted accordingly to minimize excessive bony resection as well as damage to the adjacent soft tissue structures.  A bur was then inserted through the cutting guide and used to perform the distal tibial and talar bone cuts with care taken to protect the posterior and anterior soft tissue structures.  Fluoroscopic guidance was also utilized to confirm an appropriate amount of bony resection laterally to medially without compromising the stability of the medial malleolus.  Excess bone was removed using a rongeur following use of the bur.  Continuous irrigation was used throughout  utilization of the bur.  The size 3 rail guides were then inserted and adjusted to an appropriate position which was confirmed fluoroscopically.  A size B  pin was utilized between the rail guides.  Once appropriate positioning was confirmed, they were secured with 4 Gabby wires.  I then drilled the rails and the bone reamings were saved for later use.  The rail guides were then removed and size 3 tibial and talar trial components with a +2 polyethylene spacer were inserted without difficulty.  The frame was unlocked and the foot was taken through a range of motion which demonstrated excellent stability with this combination of components.  The trials were then removed and the wound and ankle joint was copiously irrigated with sterile saline solution.  All bony and soft tissue debris was carefully removed.  The surgical team changed gloves.  The final implants were then opened.  Size 3 tibial and talar components with a +2 polyethylene were then gently impacted into place with excellent fit and alignment confirmed both visually as well as fluoroscopically.  The ankle was again copiously irrigated with sterile saline solution.  All of the half pins and the transcalcaneal pin were then removed and the leg was removed from the frame.    At this point, I brought the distalmost aspect of the fibula back to a more anatomic position.  This was reduced using reduction clamps.  A Hollis 2.7 millimeter screw was then placed in lag fashion across the osteotomy, achieving excellent bony purchase and compression across the osteotomy.  A 6-hole distal fibular anatomic locking plate was then contoured and affixed to the fibula utilizing locking screws distally and nonlocking cortical screws proximally, each of which obtained excellent bony purchase.  This led to excellent realignment of the fibula.      Multiplanar fluoroscopy demonstrated widening and instability of the syndesmosis.  I therefore was able to manually  reduce the syndesmosis and placed a guidewire tetra cortically across the distal fibula and tibia, parallel with the joint line.  Once this was confirmed using multiplanar fluoroscopy, the guidewire was overdrilled and removed.  A Hollis zip tight syndesmotic fixation device was then inserted across the distal fibula and tibia and appropriately tensioned which led to excellent reduction of the syndesmotic diastases and instability.  Excess suture was cut short.    Final AP, mortise, and lateral radiographs of the right ankle were obtained using C arm.  These demonstrated excellent restoration of alignment to the ankle as well as appropriate alignment of the total ankle arthroplasty components.  Appropriate length and trajectory of all hardware was confirmed.    Range of motion and of the ankle was then carried out which demonstrated dorsiflexion to only a few degrees beyond neutral.  This was true with both the knee extended as well as flexed, therefore consistent with a diagnosis of an Achilles tendon contracture.  I performed a fractional tendo Achilles lengthening by making 3 stab incisions in the midline of the Achilles tendon, each about 2 cm apart.  This was performed using an 11 blade.  The blade was turned medially in the distalmost and proximalmost incisions to release the medial 50% of the Achilles tendon.  In the middle incision, the blade was turned laterally to release the lateral 50% of the Achilles tendon.  This led to excellent lengthening of the tendon and allowed for dorsiflexion of approximately 15 to 20 degrees beyond neutral with the knee extended.    The surgical wounds were copiously irrigated with sterile saline solution.  500 mg of vancomycin powder was distributed in the lateral incision across the anterior and posterior aspect of the ankle joint.  Closure was carried out utilizing 0 Vicryl suture for the deep soft tissue layers followed by 3-0 Vicryl suture for the subcutaneous tissues and  3-0 nylon suture in horizontal mattress fashion for the skin.  The thigh tourniquet which had been deflated during reaming of the distal tibia and talus along with implantation of the ankle components had been reinflated prior to wound closure.  This was subsequently deflated and a sterile dressing was applied to the right foot and ankle.  The patient was then placed into a well-padded short leg splint.  Instrument sponge counts were correct x2.  The patient was awoken from anesthesia and taken to the postanesthesia care unit without complication.  She underwent popliteal and abductor canal nerve blocks with the anesthesia team postoperatively.    The patient will be nonweightbearing on the right lower extremity.  She has been instructed to keep her limb elevated at all times.  She will be placed on aspirin 325 mg for DVT prophylaxis daily.  She will follow-up in the office in approximately 2 weeks.  I was present for the entirety of the procedure and performed all critical portions.

## 2021-10-14 NOTE — OR SURGEON
Pre-Procedure patient identification:  I am the primary operating surgeon/proceduralist and I have identified the patient and confirmed laterality is right on 10/14/21 at 7:06 AM Ramon Ren MD  Phone Number: [phone]

## 2021-10-14 NOTE — ANESTHESIA PROCEDURE NOTES
Airway  Urgency: elective    Start Time: 10/14/2021 7:43 AM  Airway not difficult    General Information and Staff    Patient location during procedure: OR  Anesthesiologist: Bola Nagy MD  Resident/CRNA: Lolly aHrvey CRNA  Performed: resident/CRNA     Indications and Patient Condition  Indications for airway management: anesthesia  Preoxygenated: yes  Patient position: sniffing  MILS maintained throughout  Mask difficulty assessment: 1 - vent by mask    Final Airway Details  Final airway type: endotracheal airway      Successful airway: ETT    Successful intubation technique: direct laryngoscopy  Endotracheal tube insertion site: oral  Blade: León  Blade size: #3  ETT size (mm): 7.0  Cormack-Lehane Classification: grade I - full view of glottis  Placement verified by: chest auscultation and capnometry   Measured from: lips  ETT to lips (cm): 22  Number of attempts at approach: 1  Number of other approaches attempted: 0  Atraumatic airway insertion

## 2021-10-14 NOTE — ANESTHESIA PROCEDURE NOTES
Peripheral Block    Patient location during procedure: OR  Start time: 10/14/2021 12:19 PM  End time: 10/14/2021 12:22 PM  Reason for block: at surgeon's request and post-op pain management  Staffing  Performed: anesthesiologist   Anesthesiologist: Bola Nagy MD  Preanesthetic Checklist  Completed: patient identified, site marked, surgical consent, pre-op evaluation, timeout performed, IV checked, risks and benefits discussed, monitors and equipment checked and sterile field maintained during procedure  Peripheral Block  Patient position: left lateral decubitus  Prep: ChloraPrep  Patient monitoring: heart rate, cardiac monitor, continuous pulse ox and blood pressure  Block type: popliteal  Laterality: right  Injection technique: single-shot      Guidance: ultrasound guided          Needle  Needle gauge: 21 G  Needle length: 3 1/8 in  Needle localization: ultrasound guidance  Needle insertion depth: 3 cm  Test dose: negative  Assessment  Injection assessment: negative aspiration for heme, no paresthesia on injection, incremental injection and local visualized surrounding nerve on ultrasound  Paresthesia pain: none  Heart rate change: no  Slow fractionated injection: yes  Medications Administered - 10/14/2021 12:22 PM   Ropivacaine PF (NAROPIN) injection 0.5 %, 20 mL

## 2021-10-14 NOTE — ANESTHESIOLOGIST PRE-PROCEDURE ATTESTATION
Pre-Procedure Patient Identification:  I am the Primary Anesthesiologist and have identified the patient on 10/14/21 at 6:52 AM.   I have confirmed the procedure(s) will be performed by the following surgeon/proceduralist Ramon Ren MD.

## 2021-10-14 NOTE — HOSPITAL COURSE
Reji is a 66 y.o. female admitted on 10/14/2021 with Traumatic arthritis of right foot [M12.571]  Painful orthopaedic hardware (CMS/HCC) [T84.84XA]  Contracture of right Achilles tendon [M67.01]  Post-traumatic arthritis of right ankle [M19.171]. Principal problem is Post-traumatic arthritis of right ankle.    Past Medical History  Reji has a past medical history of COVID-19 vaccine series completed, Kidney stones, and Plantar fasciitis.    History of Present Illness  67 y/o female s/p R ankle removal of hardware, syndesmotic fixation, and total ankle arthroplasty with Dr. Ren 10/14

## 2021-10-14 NOTE — ANESTHESIA PREPROCEDURE EVALUATION
Relevant Problems   No relevant active problems       Anesthesia ROS/MED HX    Anesthesia History - neg  Pulmonary - neg  Neuro/Psych - neg  Cardiovascular- neg   Covid19 Test Reviewed and ECG reviewed  Comments: Medical clearance noted.  Hematological - neg  GI/Hepatic- neg  Musculoskeletal- neg  Renal Disease- neg  Endo/Other- neg  Body Habitus: Overweight       Past Surgical History:   Procedure Laterality Date   • BUNIONECTOMY Left    • COLONOSCOPY     • CORRECTION HAMMER TOE Bilateral        Physical Exam    Airway   Mallampati: III   TM distance: >3 FB   Neck ROM: full  Cardiovascular - normal   Rhythm: regular   Rate: normalPulmonary - normal   clear to auscultation  Other Findings   Medical clearance noted.        Anesthesia Plan    Plan: general and regional   ASA 2

## 2021-10-15 VITALS
OXYGEN SATURATION: 98 % | HEIGHT: 64 IN | SYSTOLIC BLOOD PRESSURE: 128 MMHG | HEART RATE: 81 BPM | WEIGHT: 217 LBS | DIASTOLIC BLOOD PRESSURE: 60 MMHG | TEMPERATURE: 98.1 F | BODY MASS INDEX: 37.05 KG/M2 | RESPIRATION RATE: 16 BRPM

## 2021-10-15 LAB
ANION GAP SERPL CALC-SCNC: 9 MEQ/L (ref 3–15)
BUN SERPL-MCNC: 16 MG/DL (ref 8–20)
CALCIUM SERPL-MCNC: 8.8 MG/DL (ref 8.9–10.3)
CHLORIDE SERPL-SCNC: 105 MEQ/L (ref 98–109)
CO2 SERPL-SCNC: 24 MEQ/L (ref 22–32)
CREAT SERPL-MCNC: 0.8 MG/DL (ref 0.6–1.1)
ERYTHROCYTE [DISTWIDTH] IN BLOOD BY AUTOMATED COUNT: 12.9 % (ref 11.7–14.4)
GFR SERPL CREATININE-BSD FRML MDRD: >60 ML/MIN/1.73M*2
GLUCOSE SERPL-MCNC: 103 MG/DL (ref 70–99)
HCT VFR BLDCO AUTO: 30.3 % (ref 35–45)
HGB BLD-MCNC: 9.9 G/DL (ref 11.8–15.7)
MCH RBC QN AUTO: 32.8 PG (ref 28–33.2)
MCHC RBC AUTO-ENTMCNC: 32.7 G/DL (ref 32.2–35.5)
MCV RBC AUTO: 100.3 FL (ref 83–98)
PDW BLD AUTO: 11 FL (ref 9.4–12.3)
PLATELET # BLD AUTO: 225 K/UL (ref 150–369)
POTASSIUM SERPL-SCNC: 4.1 MEQ/L (ref 3.6–5.1)
RBC # BLD AUTO: 3.02 M/UL (ref 3.93–5.22)
SODIUM SERPL-SCNC: 138 MEQ/L (ref 136–144)
WBC # BLD AUTO: 10.42 K/UL (ref 3.8–10.5)

## 2021-10-15 PROCEDURE — 97535 SELF CARE MNGMENT TRAINING: CPT | Mod: GO

## 2021-10-15 PROCEDURE — 80048 BASIC METABOLIC PNL TOTAL CA: CPT | Performed by: STUDENT IN AN ORGANIZED HEALTH CARE EDUCATION/TRAINING PROGRAM

## 2021-10-15 PROCEDURE — 3E02340 INTRODUCTION OF INFLUENZA VACCINE INTO MUSCLE, PERCUTANEOUS APPROACH: ICD-10-PCS | Performed by: ORTHOPAEDIC SURGERY

## 2021-10-15 PROCEDURE — 97162 PT EVAL MOD COMPLEX 30 MIN: CPT | Mod: GP

## 2021-10-15 PROCEDURE — 36415 COLL VENOUS BLD VENIPUNCTURE: CPT | Performed by: STUDENT IN AN ORGANIZED HEALTH CARE EDUCATION/TRAINING PROGRAM

## 2021-10-15 PROCEDURE — 97165 OT EVAL LOW COMPLEX 30 MIN: CPT | Mod: GO

## 2021-10-15 PROCEDURE — 63700000 HC SELF-ADMINISTRABLE DRUG: Performed by: NURSE PRACTITIONER

## 2021-10-15 PROCEDURE — 63600000 HC DRUGS/DETAIL CODE: Performed by: ORTHOPAEDIC SURGERY

## 2021-10-15 PROCEDURE — 25800000 HC PHARMACY IV SOLUTIONS: Performed by: NURSE PRACTITIONER

## 2021-10-15 PROCEDURE — 25000000 HC PHARMACY GENERAL: Performed by: NURSE PRACTITIONER

## 2021-10-15 PROCEDURE — 85027 COMPLETE CBC AUTOMATED: CPT | Performed by: STUDENT IN AN ORGANIZED HEALTH CARE EDUCATION/TRAINING PROGRAM

## 2021-10-15 PROCEDURE — 63600000 HC DRUGS/DETAIL CODE: Performed by: NURSE PRACTITIONER

## 2021-10-15 PROCEDURE — G0008 ADMIN INFLUENZA VIRUS VAC: HCPCS | Performed by: ORTHOPAEDIC SURGERY

## 2021-10-15 PROCEDURE — 90662 IIV NO PRSV INCREASED AG IM: CPT | Performed by: ORTHOPAEDIC SURGERY

## 2021-10-15 PROCEDURE — 99232 SBSQ HOSP IP/OBS MODERATE 35: CPT | Performed by: HOSPITALIST

## 2021-10-15 RX ORDER — OXYCODONE HYDROCHLORIDE 5 MG/1
5 TABLET ORAL EVERY 4 HOURS PRN
Qty: 40 TABLET | Refills: 0 | Status: SHIPPED | OUTPATIENT
Start: 2021-10-15 | End: 2021-10-20

## 2021-10-15 RX ADMIN — ACETAMINOPHEN 650 MG: 325 TABLET, FILM COATED ORAL at 09:58

## 2021-10-15 RX ADMIN — OXYCODONE HYDROCHLORIDE 5 MG: 5 TABLET ORAL at 12:32

## 2021-10-15 RX ADMIN — INFLUENZA A VIRUS A/VICTORIA/2570/2019 IVR-215 (H1N1) ANTIGEN (FORMALDEHYDE INACTIVATED), INFLUENZA A VIRUS A/TASMANIA/503/2020 IVR-221 (H3N2) ANTIGEN (FORMALDEHYDE INACTIVATED), INFLUENZA B VIRUS B/PHUKET/3073/2013 ANTIGEN (FORMALDEHYDE INACTIVATED), AND INFLUENZA B VIRUS B/WASHINGTON/02/2019 ANTIGEN (FORMALDEHYDE INACTIVATED) 240 MCG: 60; 60; 60; 60 INJECTION, SUSPENSION INTRAMUSCULAR at 15:59

## 2021-10-15 RX ADMIN — ASPIRIN 325 MG: 325 TABLET ORAL at 08:24

## 2021-10-15 RX ADMIN — OXYCODONE HYDROCHLORIDE 5 MG: 5 TABLET ORAL at 15:51

## 2021-10-15 RX ADMIN — OXYCODONE HYDROCHLORIDE 5 MG: 5 TABLET ORAL at 04:15

## 2021-10-15 RX ADMIN — ACETAMINOPHEN 650 MG: 325 TABLET, FILM COATED ORAL at 04:15

## 2021-10-15 RX ADMIN — OXYCODONE HYDROCHLORIDE 5 MG: 5 TABLET ORAL at 08:24

## 2021-10-15 RX ADMIN — ACETAMINOPHEN 650 MG: 325 TABLET, FILM COATED ORAL at 15:05

## 2021-10-15 RX ADMIN — DOCUSATE SODIUM AND SENNOSIDES 1 TABLET: 8.6; 5 TABLET, FILM COATED ORAL at 08:24

## 2021-10-15 RX ADMIN — CEFAZOLIN SODIUM 2 G: 10 POWDER, FOR SOLUTION INTRAVENOUS at 04:13

## 2021-10-15 ASSESSMENT — COGNITIVE AND FUNCTIONAL STATUS - GENERAL
DRESSING REGULAR LOWER BODY CLOTHING: 4 - NONE
WALKING IN HOSPITAL ROOM: 4 - NONE
HELP NEEDED FOR BATHING: 4 - NONE
STANDING UP FROM CHAIR USING ARMS: 4 - NONE
AFFECT: WFL
EATING MEALS: 4 - NONE
MOVING TO AND FROM BED TO CHAIR: 4 - NONE
CLIMB 3 TO 5 STEPS WITH RAILING: 1 - TOTAL
HELP NEEDED FOR PERSONAL GROOMING: 4 - NONE
DRESSING REGULAR UPPER BODY CLOTHING: 4 - NONE
AFFECT: WNL
TOILETING: 4 - NONE

## 2021-10-15 NOTE — PROGRESS NOTES
Orthopaedic Progress Note     Subjective:   Patient seen and examined at bedside. S/p right ankle removal of hardware, syndesmotic fixation, and total ankle arthroplasty 10/14 with Dr. Ren. Pain currently controlled, patient notes she is starting to feel her block wearing off. Maintaining elevation of RLE. No other complaints     Objective:  General: no acute distress  AVNSS    Vitals:    10/15/21 0414   BP: 130/60   Pulse: 99   Resp: 18   Temp: 37.1 °C (98.7 °F)   SpO2: 96%        Physical Exam  RLE  Splint c/d/i  SILT tibial nerve. Diminished sensation superficial/deep peroneal secondary to block  Gross motor intact wiggling toes  Toes warm and well perfused  BCR <2sec     Imaging:  XR R ankle demonstrates stable implant in acceptable alignment.     A/P:  67 y/o female s/p R ankle removal of hardware, syndesmotic fixation, and total ankle arthroplasty with Dr. Ren 10/14     -Pain control  -Maintain elevation RLE  -Postop ancef  -PT/OT  -DVT ppx  -SCDs  -NWB RLE     Laz Foley, DO  Orthopaedic Surgery  Please page [phone] with questions or concerns

## 2021-10-15 NOTE — PLAN OF CARE
Problem: Adult Inpatient Plan of Care  Goal: Readiness for Transition of Care  Intervention: Mutually Develop Transition Plan  Flowsheets (Taken 10/15/2021 1012)  Anticipated Discharge Disposition: home with home health  Equipment Needed After Discharge: none  Discharge Coordination/Progress: pt lives in a 2SH with no SARAH. Has FFSU, does not use 2nd flr. Has an uneven brick walkway to enter. Had home care 2 years ago in [place] after original ankle injury. She has her SAMREEN staying with her for 2 weeks, but she is legally blind, does not drive. ARIAN will provide transportation home and assist pt in to house. Pt would benefit from home PTand OT. Offered [facility] preferred provider list, she chose [facility], if they service her area. CM will contact [facility] liaison for referral. Pt still needs to work with PT/OT here prior to DC.  Assistive Device/Animal Currently Used at Home: (knee scooter)   cane, straight   walker, front-wheeled   bath bench   other (see comments)  Anticipated Changes Related to Illness: inability to care for self  Outpatient/Agency/Support Group Needs: homecare agency  Current Discharge Risk: physical impairment  Readmission Within the Last 30 Days: no previous admission in last 30 days  Patient/Family Anticipated Services at Transition: home health care  Patient/Family Anticipates Transition to: home with help/services  Transportation Anticipated: family or friend will provide  Concerns to be Addressed:   caregiver training   discharge planning  Patient's Choice of Community Agency(s): [facility]  Offered/Gave Vendor List: yes  Type of Home Care Services:   home OT   home PT   home health aide  Type of Skilled Nursing Care Services:   PT   OT

## 2021-10-15 NOTE — PLAN OF CARE
Problem: Adult Inpatient Plan of Care  Goal: Plan of Care Review  Outcome: Progressing  Flowsheets (Taken 10/15/2021 1155)  Progress: improving  Plan of Care Reviewed With: patient  Outcome Summary: OT eval complete. Supervision w/ transfers. Rec home w/ HH

## 2021-10-15 NOTE — PROGRESS NOTES
Hospital Medicine Service -  Daily Progress Note       SUBJECTIVE   Interval History: No acute events overnight.  Patient feels okay.  No chest pain or shortness of breath.     OBJECTIVE      Vital signs in last 24 hours:  Temp:  [36.6 °C (97.9 °F)-37.6 °C (99.7 °F)] 36.7 °C (98.1 °F)  Heart Rate:  [] 80  Resp:  [16-24] 16  BP: ()/(51-78) 120/56    Intake/Output Summary (Last 24 hours) at 10/15/2021 1018  Last data filed at 10/15/2021 0734  Gross per 24 hour   Intake 1380 ml   Output 2975 ml   Net -1595 ml       PHYSICAL EXAMINATION      Physical Exam     GEN: well-developed and well-nourished; not in acute distress  HEENT: normocephalic; atraumatic  NECK: no JVD; no bruits  CARDIO: regular rate and rhythm; no murmurs or rubs  RESP: clear to auscultation bilaterally; no rales, rhonchi, or wheezes  ABD: soft, non-distended, non-tender, normal bowel sounds  EXT: LLE dressing c/d/i  SKIN: clean, dry, warm, and intact  MUSCULOSKELETAL: no injury or deformity  NEURO: alert and oriented x 3; nonfocal  BEHAVIOR/EMOTIONAL: appropriate; cooperative     LINES, CATHETERS, DRAINS, AIRWAYS, AND WOUNDS   Lines, Drains, Airways, Wounds:  Peripheral IV (Adult) 10/14/21 Left; Posterior Forearm (Active)   Number of days: 1       Surgical Incision Ankle Right (Active)   Number of days: 1       Comments:      LABS / IMAGING / TELE      Labs  Lab Results   Component Value Date    WBC 10.42 10/15/2021    HGB 9.9 (L) 10/15/2021    HCT 30.3 (L) 10/15/2021    .3 (H) 10/15/2021     10/15/2021     Lab Results   Component Value Date    GLUCOSE 103 (H) 10/15/2021    CALCIUM 8.8 (L) 10/15/2021     10/15/2021    K 4.1 10/15/2021    CO2 24 10/15/2021     10/15/2021    BUN 16 10/15/2021    CREATININE 0.8 10/15/2021     No results found for: INR, PROTIME    Micro  Microbiology Results     Procedure Component Value Units Date/Time    COVID-19 PAT/Pre-procedural [[id number]]  (Normal) Collected: 10/11/21 1020     Specimen: Nasopharyngeal Swab from Nasopharynx Updated: 10/11/21 1844    Narrative:      The following orders were created for panel order COVID-19 PAT/Pre-procedural.  Procedure                               Abnormality         Status                     ---------                               -----------         ------                     SARS-CoV-2 (COVID-19), PCR[[id number]]   Normal              Final result                 Please view results for these tests on the individual orders.    SARS-CoV-2 (COVID-19), PCR [[id number]]  (Normal) Collected: 10/11/21 1020    Specimen: Nasopharyngeal Swab from Nasopharynx Updated: 10/11/21 1844     SARS-CoV-2 (COVID-19) Negative          Imaging  X-RAY ANKLE RIGHT 2 VIEWS    Result Date: 10/14/2021  IMPRESSION: Intraoperative fluoroscopic guidance as described above.     FL FLUOROSCOPY TECHNICAL ASSISTANCE    Result Date: 10/14/2021  IMPRESSION: Intraoperative fluoroscopic guidance as described above.       ECG/Telemetry  Patient is not on telemetry.    ASSESSMENT AND PLAN      * Post-traumatic arthritis of right ankle  Assessment & Plan  S/p R total ankle arthroplasty 10/14/21  POD #1  Management per primary team      Plan of care discussed with patient, nurse     VTE Assessment: Padua    VTE Prophylaxis Plan: asa  Code Status: Full Code  Estimated Discharge Date: 10/15/2021     Farooq Fall MD  10/15/2021

## 2021-10-15 NOTE — PLAN OF CARE
Problem: Adult Inpatient Plan of Care  Goal: Plan of Care Review  Flowsheets (Taken 10/15/2021 1216)  Plan of Care Reviewed With: patient  Outcome Summary: PT eval completed, pt S with mobility with RW or knee scooter. Pt mildly unsteady with transfer from chair to knee scooter.  Pt lives alone and to have some help but would benefit from home PT for safety, gait, and transfers.

## 2021-10-15 NOTE — PROGRESS NOTES
Patient:  Reji Bello  Location:  [facility] 4B 4220  MRN:  [id number]  Today's date:  10/15/2021    Pt resting in chair at end of session with chair alarm on, incontinence pad in place and call bell in reach. RN notified      Reji is a 66 y.o. female admitted on 10/14/2021 with Traumatic arthritis of right foot [M12.571]  Painful orthopaedic hardware (CMS/HCC) [T84.84XA]  Contracture of right Achilles tendon [M67.01]  Post-traumatic arthritis of right ankle [M19.171]. Principal problem is Post-traumatic arthritis of right ankle.    Past Medical History  Reji has a past medical history of COVID-19 vaccine series completed, Kidney stones, and Plantar fasciitis.    History of Present Illness  65 y/o female s/p R ankle removal of hardware, syndesmotic fixation, and total ankle arthroplasty with Dr. Ren 10/14         OT Vitals    Date/Time Pulse SpO2 Pt Activity O2 Therapy BP BP Location BP Method Pt Position [facility]   10/15/21 1045 82 98 % At rest None (Room air) 116/57 Left upper arm Automatic Sitting ECM   10/15/21 1120 81 98 % At rest None (Room air) 128/60 Left upper arm Automatic Sitting ECM      OT Pain    Date/Time Pain Type Side/Orientation Location Rating: Rest Rating: Activity Interventions [facility]   10/15/21 1045 Pain Assessment right ankle 2 2 care clustered; premedicated for activity ECM   10/15/21 1120 -- right ankle 2 3 care clustered; premedicated for activity; position adjusted; pillow support provided ECM          Prior Living Environment      Most Recent Value   Current Living Arrangements home   Living Environment Comment 2 story house first floor set up 0 steps to enter, tub shower has tub bench        Prior Level of Function      Most Recent Value   Ambulation independent   Transferring independent   Toileting independent   Bathing independent   Dressing independent   Prior Level of Function Comment limited by ankle pain   Assistive Device Currently Used at Home cane, straight,  walker,  front-wheeled  [tub bench, knee scooter]        Occupational Profile      Most Recent Value   Reason for Services/Referral s/p R ankle arthroplasty   Occupational History/Life Experiences recently moved from [place]   Performance Patterns IND   Environmental Supports and Barriers sister in law staying until 10/30   Patient Goals go home today           OT Evaluation and Treatment - 10/15/21 1041        OT Time Calculation    Start Time 1041     Stop Time 1129     Time Calculation (min) 48 min        Session Details    Document Type initial evaluation     Mode of Treatment occupational therapy        General Information    Patient Profile Reviewed yes     Onset of Illness/Injury or Date of Surgery 10/14/21     Referring Physician Mikal     Patient/Family/Caregiver Comments/Observations RN cleared for OT     General Observations of Patient Pt resting in bedside chair     Existing Precautions/Restrictions weight bearing; fall        Weight-bearing Status    Right LE Weight-Bearing Status non weight-bearing (NWB)        Cognition/Psychosocial    Affect/Mental Status (Cognition) WNL     Orientation Status (Cognition) oriented x 4     Follows Commands (Cognition) WFL     Cognitive Function WFL     Comment, Cognition pleasant and receptive to education     Cognitive Interventions/Strategies occupation/activity based interventions        Hearing Assessment    Hearing Status WFL        Vision Assessment/Intervention    Visual Impairment/Limitations corrective lenses for reading        Sensory Assessment (Somatosensory)    Sensory Assessment (Somatosensory) UE sensation intact        Range of Motion (ROM)    Range of Motion ROM is WFL; bilateral upper extremities        Strength (Manual Muscle Testing)    Strength (Manual Muscle Testing) strength is WFL; bilateral upper extremities        Bed Mobility    Comment (Bed Mobility) rec'd in bedside chair        Transfers    Transfers toilet transfer; tub transfer     Maintains  Weight-Bearing Status (Transfers) able to maintain     Comment short household distance mobility w/ rw and knee scooter at close supervision level        Sit to Stand Transfer    [place], Sit to Stand Transfer supervision; verbal cues     Verbal Cues hand placement     Assistive Device walker, front-wheeled     Comment from chair t/o session        Stand to Sit Transfer    [place], Stand to Sit Transfer supervision     Verbal Cues hand placement     Assistive Device walker, front-wheeled     Comment to chair t/o session        Toilet Transfer    Transfer Technique sit-stand; stand-sit     Comment educated on commode for over toilet at home demos understanding, supervision for sit<>stand to chair        Tub Transfer    Transfer Technique sit and swing     [place], Tub Transfer supervision     Verbal Cues hand placement; maintaining precautions; technique     Assistive Device tub bench     Comment reports tub bench at home, supervision for technique        Safety Issues, Functional Mobility    Impairments Affecting Function (Mobility) pain     Comment, Safety Issues/Impairments (Mobility) no safety concerns demonstrated        Balance    Balance Assessment sitting static balance; sitting dynamic balance; sit to stand dynamic balance; standing static balance; standing dynamic balance     Static Sitting Balance WFL; sitting in chair     Dynamic Sitting Balance WFL; sitting in chair     Sit to Stand Dynamic Balance WFL; supported     Static Standing Balance WFL; supported     Dynamic Standing Balance WFL; supported     Comment, Balance no LOB noted w/ rw or knee scooter        Motor Skills    Motor Skills functional endurance     Functional Endurance good        Upper Body Dressing    Tasks pull over garment     Self-Performance threads left arm, bra/undershirt; threads right arm, bra/undershirt; fastens bra; threads left arm, shirt; threads right arm, shirt; pulls shirt over head/around back; pulls shirt  down/adjusts     [place] Assistance obtains clothes     [place] modified independence     Position unsupported sitting; unsupported standing     Adaptive Equipment none        Lower Body Dressing    Tasks underwear     Self-Performance threads left leg, underpants; threads right leg, underpants; pulls underpants up or down     [place] supervision     Position unsupported standing; unsupported sitting     Comment supervision for safety in stance        Toileting    [place] supervision; adjust/manage clothing     Position unsupported sitting; unsupported standing     Setup Assistance obtain supplies        Self-Feeding    [place] independent     Adaptive Equipment none     Position supported sitting        BADL Safety/Performance    Impairments, BADL Safety/Performance pain     Skilled BADL Treatment/Intervention BADL process/adaptation training     Progress in BADL Status effort and initiation; improvement noted        ADL Interventions    Energy Conservation Techniques correct posture facilitated; correct body mechanics utilized; equipment and device use facilitated     Self-Care (BADL) Promotion set-up provided; cues and encouragement provided; close supervision for safety provided        Coping    Observed Emotional State cooperative     Verbalized Emotional State acceptance     Trust Relationship/Rapport care explained        AM-PAC (TM) - ADL (Current Function)    Putting on and taking off regular lower body clothing? 4 - None     Bathing? 4 - None     Toileting? 4 - None     Putting on/taking off regular upper body clothing? 4 - None     How much help for taking care of personal grooming? 4 - None     Eating meals? 4 - None     AM-PAC (TM) ADL Score 24        Assessment/Plan (OT)    Daily Outcome Statement OT Eval complete. Pt s/p R ankle arthoplasty now NWB. Pt demos sit <> stand w/ supervision. Pt is able ot maintain NWB t/o session w/ rw and trialed knee scooter ; see PT note. Pt MOD IND  for UBd. Pt is supervision for safety in stance for LBD. Pt reporting sister in law will be staying to assist as needed at d/c. Recommend home w/ HH OT at d/c to ensure safety w/ transfers within the home setting.     Therapy Frequency evaluation only     Planned Therapy Interventions occupation/activity based interventions; transfer/mobility retraining               OT Assessment/Plan      Most Recent Value   OT Recommended Discharge Disposition home with home health,  home with assistance at 10/15/2021 1041   Anticipated Equipment Needs At Discharge (OT) commode, 3-in-1 at 10/15/2021 1041   Patient/Family Therapy Goal Statement go home today at 10/15/2021 1041                    Education Documentation  Safety, taught by Ludy Shaffer OT at 10/15/2021 11:56 AM.  Learner: Patient  Readiness: Acceptance  Method: Explanation, Demonstration  Response: Demonstrated Understanding, Verbalizes Understanding  Comment: OT POC, NWB R LE, tub transfer, toilet transfer w/ commode, ADL safety/home safety, HH OT, LBD strategy    Home Safety, taught by Ludy Shaffer OT at 10/15/2021 11:56 AM.  Learner: Patient  Readiness: Acceptance  Method: Explanation, Demonstration  Response: Demonstrated Understanding, Verbalizes Understanding  Comment: OT POC, NWB R LE, tub transfer, toilet transfer w/ commode, ADL safety/home safety, HH OT, LBD strategy

## 2021-10-15 NOTE — PROGRESS NOTES
Referral received and chart reviewed. Unable to accept referral patient resides outside of service area.  Notified Siria Call.

## 2021-10-15 NOTE — PROGRESS NOTES
Patient: Reji Bello  Location:  [facility] 4B 4220  MRN:  [id number]  Today's date:  10/15/2021      Pt in recliner with all needs in reach and nsg notified.  Reji is a 66 y.o. female admitted on 10/14/2021 with Traumatic arthritis of right foot [M12.571]  Painful orthopaedic hardware (CMS/HCC) [T84.84XA]  Contracture of right Achilles tendon [M67.01]  Post-traumatic arthritis of right ankle [M19.171]. Principal problem is Post-traumatic arthritis of right ankle.    Past Medical History  Reji has a past medical history of COVID-19 vaccine series completed, Kidney stones, and Plantar fasciitis.    History of Present Illness  67 y/o female s/p R ankle removal of hardware, syndesmotic fixation, and total ankle arthroplasty with Dr. Ren 10/14         PT Vitals    Date/Time Pulse SpO2 Pt Activity O2 Therapy BP BP Location BP Method Pt Position [facility]   10/15/21 1045 82 98 % At rest None (Room air) 116/57 Left upper arm Automatic Sitting ECM   10/15/21 1120 81 98 % At rest None (Room air) 128/60 Left upper arm Automatic Sitting ECM      PT Pain    Date/Time Pain Type Side/Orientation Location Rating: Rest Rating: Activity Interventions [facility]   10/15/21 1045 Pain Assessment right ankle 2 2 care clustered; premedicated for activity [facility]   10/15/21 1120 -- right ankle 2 3 care clustered; premedicated for activity; position adjusted; pillow support provided ECM          Prior Living Environment      Most Recent Value   Current Living Arrangements home   Living Environment Comment 2 story house first floor set up 0 steps to enter, tub shower has tub bench        Prior Level of Function      Most Recent Value   Ambulation independent   Transferring independent   Toileting independent   Bathing independent   Dressing independent   Eating independent   Communication understands/communicates without difficulty   Prior Level of Function Comment limited by ankle pain   Assistive Device Currently Used at Home cane, straight,   walker, front-wheeled  [tub bench, knee scooter]           PT Evaluation and Treatment - 10/15/21 1040        PT Time Calculation    Start Time 1040     Stop Time 1128     Time Calculation (min) 48 min        Session Details    Document Type initial evaluation     Mode of Treatment physical therapy        General Information    Patient Profile Reviewed yes     Onset of Illness/Injury or Date of Surgery 10/14/21     Referring Physician Spencer     Patient/Family/Caregiver Comments/Observations RN cleared for PT     General Observations of Patient pt resting in bedside chair     Existing Precautions/Restrictions weight bearing; fall        Weight-bearing Status    Right LE Weight-Bearing Status non weight-bearing (NWB)        Cognition/Psychosocial    Affect/Mental Status (Cognition) WFL     Orientation Status (Cognition) oriented x 4        Sensory Assessment (Somatosensory)    Sensory Assessment (Somatosensory) LE sensation intact        Range of Motion (ROM)    Range of Motion ROM is WFL; bilateral lower extremities   except R ankle in splint       Strength (Manual Muscle Testing)    Left Lower Extremity Strength left LE strength is WFL     Right Lower Extremity Strength right LE strength is WFL except; ankle     Ankle, Right (Strength) --   NT 2/2 NWB and splinted       Bed Mobility    Comment (Bed Mobility) pt OOB in chair        Transfers    Transfers car transfer        Sit to Stand Transfer    [place], Sit to Stand Transfer supervision; verbal cues     Verbal Cues hand placement; safety; technique     Assistive Device walker, front-wheeled     Comment from chair to walker and from chair to knee scooter        Stand to Sit Transfer    [place], Stand to Sit Transfer supervision; verbal cues     Verbal Cues hand placement; safety     Comment from walker and from knee scooter        Car Transfer    Transfer Technique sit-stand; stand-sit     [place], Car Transfer supervision; verbal cues     Verbal  Cues hand placement; safety; technique        Gait Training    [place], Gait supervision     Assistive Device walker, front-wheeled     Distance in Feet 20 feet     Deviations/Abnormal Patterns (Gait) gait speed decreased; mady decreased     Maintains Weight-bearing Status (Gait) able to maintain     Comment (Gait/Stairs) pt amb 20 ft with RW and 20 ft with knee scooter        Stairs Training    [place], Stairs not tested     Comment does  not have steps to do at home        Balance    Static Sitting Balance WFL     Dynamic Sitting Balance WFL     Sit to Stand Dynamic Balance WFL; supported     Static Standing Balance WFL; supported     Dynamic Standing Balance WFL; supported        AM-PAC (TM) - Mobility (Current Function)    Turning from your back to your side while in a flat bed without using bedrails? 4 - None     Moving from lying on your back to sitting on the side of a flat bed without using bedrails? 4 - None     Moving to and from a bed to a chair? 4 - None     Standing up from a chair using your arms? 4 - None     To walk in a hospital room? 4 - None     Climbing 3-5 steps with a railing? 1 - Total     AM-PAC (TM) Mobility Score 21        Assessment/Plan (PT)    Daily Outcome Statement PT eval completed, pt S with mobility with RW or knee scooter. Pt mildly unsteady with transfer from chair to knee scooter.  Pt lives alone and to have some help but would benefit from home PT for safety, gait, and transfers.     Therapy Frequency evaluation only               PT Assessment/Plan      Most Recent Value   PT Recommended Discharge Disposition home,  home with home health at 10/15/2021 1040   Anticipated Equipment Needs at Discharge (PT) none at 10/15/2021 1040   Patient/Family Therapy Goals Statement to go home today. at 10/15/2021 1040                    Education Documentation  Equipment/Home Supplies, taught by Abelino Guerrero, PT at 10/15/2021 12:19 PM.  Learner: Patient  Readiness: Eager  Method:  Explanation, Demonstration  Response: Verbalizes Understanding  Comment: Pt educ for knee scooter use.

## 2021-10-15 NOTE — PATIENT CARE CONFERENCE
Care Progression Rounds Note  Date: 10/15/2021  Time: 8:53 AM     Patient Name: Reji Bello     Medical Record Number: [id number]   YOB: 1955  Sex: Female      Room/Bed: 4220    Admitting Diagnosis: Traumatic arthritis of right foot [M12.571]  Painful orthopaedic hardware (CMS/HCC) [T84.84XA]  Contracture of right Achilles tendon [M67.01]  Post-traumatic arthritis of right ankle [M19.171]   Admit Date/Time: 10/14/2021  5:58 AM    Primary Diagnosis: Post-traumatic arthritis of right ankle  Principal Problem: Post-traumatic arthritis of right ankle    GMLOS: pending  Anticipated Discharge Date: 10/15/2021    AM-PAC:  Mobility Score:      Discharge Planning:       Barriers to Discharge:  Barriers to Discharge: Medical issues not resolved    Participants:  nursing, social work/services

## 2021-10-15 NOTE — CONSULTS
CM/SW consults noted. Please see POC note for details. [facility] liaison Val notified of referral.  1234: CM notified by Val, [facility] liaison, that pt is not in their area. CM spoke with pt again and she would like to use [facility]. PT/OT rec are home with HC. Spoke with inpt SUSANA Kendall who stated pt is a [facility] pt, so need I need to check with their nurse navigator before arranging home care. CC called and spoke with Amber rodgers [facility], who stated they don't navigate foot and ankle surgeries, so it was fine to arrange whatever the pt needs. CC sent referral to [facility] through [employer]. CC also spoke with [facility] liameir Babb, #[phone], and notified her of referral. Inpt SUSANA Kendall stated she was getting pt a BSC for home. CC following for futher needs for DC.  [facility] [facility]16 [facility] 10/17.

## 2021-10-18 ENCOUNTER — DOCUMENTATION (OUTPATIENT)
Dept: ORTHOPEDICS | Facility: HOSPITAL | Age: 66
End: 2021-10-18

## 2021-10-18 NOTE — PROGRESS NOTES
Reji Bello may have been exposed to COVID 19 while in the hospital.      Reji Bello is currently discharged.      The patient  was notified of the potential exposure.  The patient currently does not have symptoms.      The patient  express(es) understanding of the potential exposure and all questions were answered.      The patient  was educated on the signs and symptoms of COVID 19.  The patient was recommended to quarantine for 14 days if unvaccinated.  They were also educated on when to call their PCP or potentially call 911 if an emergency.      We offered COVID testing after the potential exposure.  The patient  does not want follow up testing. (At this point in time, since fully vaccinated and asymptomatic.)        Ramon Ren MD  10/18/2021

## 2022-12-01 NOTE — RESULT ENCOUNTER NOTE
Please inform patient that her recent mammogram was sent to me. I haven't seen her since 2012 at [facility]. Please instruct her to have the mammograms forwarded to her present gynecologist or PCP.

## 2023-06-15 ENCOUNTER — SEXUAL HEALTH (OUTPATIENT)
Dept: SURGERY | Facility: CLINIC | Age: 68
End: 2023-06-15

## 2023-06-15 DIAGNOSIS — Z11.3 SCREENING FOR STD (SEXUALLY TRANSMITTED DISEASE): Primary | ICD-10-CM

## 2023-06-15 NOTE — PROGRESS NOTES
Assessment/Plan:  Urine collected for GC/CT testing  Blood collected for HIV/syphilis testing  Recommend safer sex practices including 100% condom use  Recommend regular STD testing  Follow up 1 week for results  Practicing safe sex using a condom for each sexually encounter  Condoms offer the best protection against STD's by acting as a physical barrier to prevent the exchange of semen, vaginal fluids, and blood between partners  Condoms are not a 100% effective in protection  Reducing the number of sexual partners can also help to reduce the risk of the transmission of STD's along with regular STD screening  No problem-specific Assessment & Plan notes found for this encounter  Diagnoses and all orders for this visit:    Screening for STD (sexually transmitted disease)          Subjective:      Patient ID: Cecilia Pierce is a 70 y o  female  Patient seen in STD clinic for STD screening  Patient is not currently sexually active, but plans to start sexual activity  Denies vaginal discharge, itching, burning, pain, rashes or sores, fever or chills  The following portions of the patient's history were reviewed and updated as appropriate: allergies, current medications, past family history, past medical history, past social history, past surgical history and problem list     Review of Systems   Constitutional: Negative for chills, diaphoresis, fatigue and fever  Genitourinary: Negative for decreased urine volume, difficulty urinating, dyspareunia, dysuria, frequency, genital sores, hematuria, pelvic pain, urgency, vaginal bleeding, vaginal discharge and vaginal pain  Skin: Negative for rash and wound  Hematological: Negative for adenopathy  Objective: There were no vitals taken for this visit  Physical Exam  Nursing note reviewed  Constitutional:       General: She is not in acute distress  Appearance: Normal appearance  She is well-developed   She is not ill-appearing, toxic-appearing or diaphoretic  Genitourinary:     Exam position: Supine  Labia:         Right: No rash, tenderness, lesion or injury  Left: No rash, tenderness, lesion or injury  Vagina: Normal  No signs of injury and foreign body  No vaginal discharge, erythema, tenderness or bleeding  Cervix: No discharge or friability  Uterus: Not enlarged and not tender  Neurological:      Mental Status: She is alert and oriented to person, place, and time  Psychiatric:         Mood and Affect: Mood normal          Behavior: Behavior normal          Thought Content: Thought content normal          Judgment: Judgment normal                 CHIEF CONCERN(S)     Patient here for routine testing  Patient has no symptoms at this time    No LMP recorded  LPS 12/2022    Birth Control Method no    Condom Used: No     Sexual Preference :  Male    Date of Last Sexual Exposure: 6/2015    # of Partners: Last 30 days 0, Last 80 days 0 and Last Year 0    Sexual Practices: Oral and Vaginal      Previously Sexually Transmitted Diseases  Type Date Source of Care Treatment Comment   N/A                         Test Date Results   RPR n/a    HIV 1989 negative   GC n/a    CT n/a          1  CURRENT RISK BEHAVIOR(S)    sex under the influence of drugs or alcohol and Other     PREVIOUS SUCCESSES    Maintained a monogamous relationship with only one partner, Practiced abstinence and Other        3  SAFER GOAL BEHAVIOR(S)    Get tested if condom breaks/ leaks          4  PERSON ACTION PLAN:    > BARRIERS:    No condom use or discussions    > BENEFITS:    Obtain free condoms from [facility] to decrease STD exposure        > ACTION STEPS:      Get tested is an exposure occurred such as a condom breaks/ leaked          4  REFERRALS:      HIV consent given

## 2023-06-22 ENCOUNTER — SEXUAL HEALTH (OUTPATIENT)
Dept: SURGERY | Facility: CLINIC | Age: 68
End: 2023-06-22

## 2023-06-22 DIAGNOSIS — Z71.2 ENCOUNTER TO DISCUSS TEST RESULTS: Primary | ICD-10-CM

## 2023-06-22 NOTE — PROGRESS NOTES
Pt here for STD/HIV results  GC, CT, RPR, HIV all negative, reviewed and given to pt  Pt re-educated on safe sex practices  Pt stated understanding

## (undated) DEVICE — ***USE 132714***BANDAGE ELASTIC 6IN MEDICHOICE

## (undated) DEVICE — PACK RFID KNEE ADD ON

## (undated) DEVICE — SUCTION 18FR FRAZIER DISPOSABLE

## (undated) DEVICE — GAUZE XEROFORM 1X8 NON-STERILE PACKET

## (undated) DEVICE — SUTURE VICRYL 3-0 J416H SH UNDYED

## (undated) DEVICE — DRAPE C-ARM X-RAY EQUIPMENT IMAGE

## (undated) DEVICE — APPLICATOR CHLORAPREP 26ML ORANGE TINT

## (undated) DEVICE — Device

## (undated) DEVICE — DRAPE LARGE REVERSE FOLD

## (undated) DEVICE — SOLN IRRIG STER WATER 1000ML

## (undated) DEVICE — GLOVE SURG PROTEXIS PF 7.5

## (undated) DEVICE — MANIFOLD FOUR PORT NEPTUNE

## (undated) DEVICE — WIRE KIRSCHNER 1.6MM

## (undated) DEVICE — BANDAGE ELASTIC 4IN MEDC

## (undated) DEVICE — HOOD FLYTE SURGICOOL

## (undated) DEVICE — ***USE 143206***SYRINGE BULB

## (undated) DEVICE — SET HANDPIECE INTERPULSE

## (undated) DEVICE — GOWN SURG X-LARGE MICROCOOL

## (undated) DEVICE — BLADE LONG MED 31.0X9.0MM

## (undated) DEVICE — PACK UNIVERSAL TOTAL JOINT

## (undated) DEVICE — ***USE 59070*** SUTURE VICRYL 0 J260H CT-1 27IN UNDYED

## (undated) DEVICE — PENEVAC1 NONSTICK SMOKE EVAC

## (undated) DEVICE — BANDAGE KERLIX STERILE 4.5INX 4.1YDS

## (undated) DEVICE — CLOTH PREPPING SAGE 2% CHG 2/PK

## (undated) DEVICE — VEST STERILE

## (undated) DEVICE — ***USE 57698*** SLEEVE FLOWTRON DVT CALF SINGLE USE

## (undated) DEVICE — SOLN IRRIG .9%SOD 1000ML

## (undated) DEVICE — MANIFOLD SINGLE PORT NEPTUNE

## (undated) DEVICE — DRAPE C-ARMOR

## (undated) DEVICE — GLOVE SURG PROTEXIS PF 8

## (undated) DEVICE — SUTURE ETHILON 3-0   663G

## (undated) DEVICE — SPONGE LAP 18X18 SAFE-T RFID ENHANCED XRAY

## (undated) DEVICE — CUFF TOURNIQUET DISP 34 X 4